# Patient Record
Sex: MALE | Race: WHITE | Employment: UNEMPLOYED | URBAN - METROPOLITAN AREA
[De-identification: names, ages, dates, MRNs, and addresses within clinical notes are randomized per-mention and may not be internally consistent; named-entity substitution may affect disease eponyms.]

---

## 2018-06-25 ENCOUNTER — TRANSFERRED RECORDS (OUTPATIENT)
Dept: HEALTH INFORMATION MANAGEMENT | Facility: CLINIC | Age: 33
End: 2018-06-25

## 2018-06-25 ENCOUNTER — HOSPITAL ENCOUNTER (INPATIENT)
Facility: HOSPITAL | Age: 33
LOS: 8 days | Discharge: HOME | End: 2018-07-03
Attending: PSYCHIATRY & NEUROLOGY | Admitting: PSYCHIATRY & NEUROLOGY
Payer: MEDICAID

## 2018-06-25 DIAGNOSIS — F39 EPISODIC MOOD DISORDER (H): ICD-10-CM

## 2018-06-25 DIAGNOSIS — F29 PSYCHOSIS, UNSPECIFIED PSYCHOSIS TYPE (H): ICD-10-CM

## 2018-06-25 DIAGNOSIS — F41.9 ANXIETY: Primary | ICD-10-CM

## 2018-06-25 LAB
ALT SERPL-CCNC: 20 IU/L (ref 6–40)
AST SERPL-CCNC: 17 IU/L (ref 10–40)
CREAT SERPL-MCNC: 1.35 MG/DL (ref 0.7–1.2)
GLUCOSE SERPL-MCNC: 111 MG/DL (ref 70–100)
POTASSIUM SERPL-SCNC: 3.5 MEQ/L (ref 3.4–5.1)

## 2018-06-25 PROCEDURE — 25000132 ZZH RX MED GY IP 250 OP 250 PS 637: Performed by: NURSE PRACTITIONER

## 2018-06-25 PROCEDURE — 12400000 ZZH R&B MH

## 2018-06-25 RX ORDER — ALUMINA, MAGNESIA, AND SIMETHICONE 2400; 2400; 240 MG/30ML; MG/30ML; MG/30ML
30 SUSPENSION ORAL EVERY 4 HOURS PRN
Status: DISCONTINUED | OUTPATIENT
Start: 2018-06-25 | End: 2018-07-03 | Stop reason: HOSPADM

## 2018-06-25 RX ORDER — HYDROXYZINE HYDROCHLORIDE 25 MG/1
25-50 TABLET, FILM COATED ORAL EVERY 4 HOURS PRN
Status: DISCONTINUED | OUTPATIENT
Start: 2018-06-25 | End: 2018-07-03 | Stop reason: HOSPADM

## 2018-06-25 RX ORDER — OLANZAPINE 10 MG/2ML
10 INJECTION, POWDER, FOR SOLUTION INTRAMUSCULAR 3 TIMES DAILY PRN
Status: DISCONTINUED | OUTPATIENT
Start: 2018-06-25 | End: 2018-06-29 | Stop reason: ALTCHOICE

## 2018-06-25 RX ORDER — OLANZAPINE 10 MG/1
10 TABLET ORAL 3 TIMES DAILY PRN
Status: DISCONTINUED | OUTPATIENT
Start: 2018-06-25 | End: 2018-06-29 | Stop reason: ALTCHOICE

## 2018-06-25 RX ORDER — ACETAMINOPHEN 325 MG/1
650 TABLET ORAL EVERY 4 HOURS PRN
Status: DISCONTINUED | OUTPATIENT
Start: 2018-06-25 | End: 2018-07-03 | Stop reason: HOSPADM

## 2018-06-25 RX ADMIN — HYDROXYZINE HYDROCHLORIDE 50 MG: 25 TABLET ORAL at 20:09

## 2018-06-25 RX ADMIN — NICOTINE POLACRILEX 4 MG: 2 GUM, CHEWING ORAL at 20:09

## 2018-06-25 RX ADMIN — OLANZAPINE 10 MG: 10 TABLET ORAL at 20:09

## 2018-06-25 ASSESSMENT — ACTIVITIES OF DAILY LIVING (ADL)
RETIRED_EATING: 0-->INDEPENDENT
LAUNDRY: UNABLE TO COMPLETE
DRESS: SCRUBS (BEHAVIORAL HEALTH);INDEPENDENT
RETIRED_COMMUNICATION: 0-->UNDERSTANDS/COMMUNICATES WITHOUT DIFFICULTY
AMBULATION: 0-->INDEPENDENT
DRESS: 0-->INDEPENDENT
ORAL_HYGIENE: INDEPENDENT
GROOMING: INDEPENDENT
TRANSFERRING: 0-->INDEPENDENT
BATHING: 0-->INDEPENDENT
COGNITION: 0 - NO COGNITION ISSUES REPORTED
FALL_HISTORY_WITHIN_LAST_SIX_MONTHS: NO
TOILETING: 0-->INDEPENDENT
SWALLOWING: 0-->SWALLOWS FOODS/LIQUIDS WITHOUT DIFFICULTY

## 2018-06-25 NOTE — IP AVS SNAPSHOT
MRN:[id number]                      After Visit Summary   6/25/2018    Hung Santiago    MRN: [id number]           Thank you!     Thank you for choosing [facility] for your care. Our goal is always to provide you with excellent care. Hearing back from our patients is one way we can continue to improve our services. Please take a few minutes to complete the written survey that you may receive in the mail after you visit with us. Thank you!        Patient Information     Date Of Birth          1985        Designated Caregiver       Most Recent Value    Caregiver    Will someone help with your care after discharge? no      About your hospital stay     You were admitted on:  June 25, 2018 You last received care in the:  [facility]    You were discharged on:  July 3, 2018       Who to Call     For medical emergencies, please call 911.  For non-urgent questions about your medical care, please call your primary care provider or clinic, None          Attending Provider     Provider Specialty    Archie Hoskins MD Psychiatry    Radha Lee NP Nurse Practitioner       Primary Care Provider Fax #    Physician No Ref-Primary [phone]      Further instructions from your care team       Behavioral Discharge Planning and Instructions    Summary: Patient was admitted with increased psychosis     Main Diagnosis: Psychosis, NOS, Insomnia, NOS, Cannabis abuse, with intoxication, r/o with induced perceptual disorder    Major Treatments, Procedures and Findings: Stabilize with medications, connect with community programs.    Symptoms to Report: feeling more aggressive, increased confusion, losing more sleep, mood getting worse or thoughts of suicide    Lifestyle Adjustment: Take all medications as prescribed, meet with doctor/ medication provider, out patient therapist, , and [facility] worker as scheduled. Abstain from alcohol or any unprescribed drugs.    Psychiatry Follow-up:       [facility]  - Sabine Mendenhalleusebio- As arranged   [address]  [place] [address]  Phone: [phone]    [facility]  PCP- Dr. Garcia- July 6th @ 11:15am   Will need Lithium Level drawn  [address]     [place] [address]    Phone\"[phone]   Fax:[phone]     [facility]   Medication Management- Dr. Jesus - July 25th @ 8:00am    [address]   [place] [address]  Phone: [phone]  Fax:     [facility]- Diagnostic assessment - July 19th @ 9:15am   [address] [facility]  [place] [id number] [phone]  Phone: [phone]  Fax: [phone]      Resources:   Crisis Intervention: 868.857.9892 or 095-740-0528 (TTY: 633.592.6684).  Call anytime for help.  National [place] on Mental Illness (www.[place].jennifer.org): 899.610.1189 or 747-403-9578.  Alcoholics Anonymous (www.alcoholics-anonymous.org): Check your phone book for your local chapter.  Suicide Awareness Voices of Education (SAVE) (www.save.org): 914-534-CHCE (6378)  National Suicide Prevention Line (www.mentalhealthmn.org): 697-303-WMSN (9883)  Mental Health Consumer/Survivor Network of [place] (www.mhcsn.net): 473.926.9132 or 535-499-5930  Mental Health Association of [place] (www.mentalhealth.org): 413.407.8750 or 228-962-1983    General Medication Instructions:   See your medication sheet(s) for instructions.   Take all medicines as directed.  Make no changes unless your doctor suggests them.   Go to all your doctor visits.  Be sure to have all your required lab tests. This way, your medicines can be refilled on time.  Do not use any drugs not prescribed by your doctor.  Avoid alcohol.      [place] Statewide:  [place] Crisis and Referral Services: 1-814.452.1060  National Suicide Prevention Lifeline: 0-232-309-TALK (8255)   - rjm6uplh- Text \"Life\" to 45035  First Call for Help: 2-1-1  JENNIFER Helpline- 4-532-PORL-HELP        Pending Results     No orders found from 6/23/2018 to 6/26/2018.          " "  Statement of Approval     Ordered          18 1429  I have reviewed and agree with all the recommendations and orders detailed in this document.  EFFECTIVE NOW     Approved and electronically signed by:  Rafael Wilkinson NP             Admission Information     Date & Time Provider Department Dept. Phone    2018 Radha Lee NP [facility] [phone]      Your Vitals Were     Blood Pressure Pulse Temperature Respirations Height Weight    134/83 90 99.1  F (37.3  C) (Tympanic) 16 1.753 m (5' 9\") 74.2 kg (163 lb 9.6 oz)    Pulse Oximetry BMI (Body Mass Index)                96% 24.16 kg/m2          MyChart Information     [employer] lets you send messages to your doctor, view your test results, renew your prescriptions, schedule appointments and more. To sign up, go to [email or web address] . Click on \"Log in\" on the left side of the screen, which will take you to the Welcome page. Then click on \"Sign up Now\" on the right side of the page.     You will be asked to enter the access code listed below, as well as some personal information. Please follow the directions to create your username and password.     Your access code is: [email or web address]  Expires: 2018 11:02 AM     Your access code will  in 90 days. If you need help or a new code, please call your [facility] clinic or [phone].        Care EveryWhere ID     This is your Care EveryWhere ID. This could be used by other organizations to access your [facility] medical records  [id number]        Equal Access to Services     [facility] Center[facility] : Hadii rodney hammond Sojael, waaxda luqadaha, qaybta kaalmada michelle yee . So [facility] [phone].    ATENCIÓN: Si habla español, tiene a cuba disposición servicios gratuitos de asistencia lingüística. Llame al [phone].    We comply with applicable federal civil rights laws and [place] laws. We do not discriminate on the basis of race, color, " national origin, age, disability, sex, sexual orientation, or gender identity.               Review of your medicines      START taking        Dose / Directions    hydrOXYzine 25 MG tablet   Commonly known as:  ATARAX   Used for:  Anxiety        Dose:  25-50 mg   Take 1-2 tablets (25-50 mg) by mouth 2 times daily as needed for anxiety   Quantity:  60 tablet   Refills:  0       lithium 300 MG CR tablet   Commonly known as:  ESKALITH/LITHOBID   Used for:  Episodic mood disorder (H)        Dose:  300 mg   Take 1 tablet (300 mg) by mouth 2 times daily   Quantity:  60 tablet   Refills:  0         CONTINUE these medicines which may have CHANGED, or have new prescriptions. If we are uncertain of the size of tablets/capsules you have at home, strength may be listed as something that might have changed.        Dose / Directions    * QUEtiapine 25 MG tablet   Commonly known as:  SEROquel   This may have changed:    - how much to take  - when to take this  - reasons to take this        Dose:  25 mg   Take 1 tablet (25 mg) by mouth 3 times daily as needed (anxiety, agitation, racing thoughts or hallucinations)   Quantity:  60 tablet   Refills:  0       * QUEtiapine 400 MG tablet   Commonly known as:  SEROquel   This may have changed:  You were already taking a medication with the same name, and this prescription was added. Make sure you understand how and when to take each.        Dose:  800 mg   Take 2 tablets (800 mg) by mouth At Bedtime   Quantity:  60 tablet   Refills:  0       * Notice:  This list has 2 medication(s) that are the same as other medications prescribed for you. Read the directions carefully, and ask your doctor or other care provider to review them with you.         Where to get your medicines      These medications were sent to [id number] - [place], [place] - [address]  [address] [place] [address]    Hours:  Tested OK 1/21/03  CASH Phone:  [phone]     hydrOXYzine 25 MG tablet    lithium  300 MG CR tablet    QUEtiapine 25 MG tablet    QUEtiapine 400 MG tablet                Protect others around you: Learn how to safely use, store and throw away your medicines at www.disposemymeds.org.             Medication List: This is a list of all your medications and when to take them. Check marks below indicate your daily home schedule. Keep this list as a reference.      Medications           Morning Afternoon Evening Bedtime As Needed    hydrOXYzine 25 MG tablet   Commonly known as:  ATARAX   Take 1-2 tablets (25-50 mg) by mouth 2 times daily as needed for anxiety   Last time this was given:  50 mg on 7/2/2018  1:42 PM                                lithium 300 MG CR tablet   Commonly known as:  ESKALITH/LITHOBID   Take 1 tablet (300 mg) by mouth 2 times daily   Last time this was given:  300 mg on 7/2/2018  8:53 PM                                * QUEtiapine 25 MG tablet   Commonly known as:  SEROquel   Take 1 tablet (25 mg) by mouth 3 times daily as needed (anxiety, agitation, racing thoughts or hallucinations)   Last time this was given:  800 mg on 7/2/2018  8:54 PM                                * QUEtiapine 400 MG tablet   Commonly known as:  SEROquel   Take 2 tablets (800 mg) by mouth At Bedtime   Last time this was given:  800 mg on 7/2/2018  8:54 PM                                * Notice:  This list has 2 medication(s) that are the same as other medications prescribed for you. Read the directions carefully, and ask your doctor or other care provider to review them with you.

## 2018-06-25 NOTE — IP AVS SNAPSHOT
[facility]    [address] [place] [id number]    Phone:  [phone]    Fax:  [phone]                                       After Visit Summary   6/25/2018    Hung Santiago    MRN: [id number]           After Visit Summary Signature Page     I have received my discharge instructions, and my questions have been answered. I have discussed any challenges I see with this plan with the nurse or doctor.    ..........................................................................................................................................  Patient/Patient Representative Signature      ..........................................................................................................................................  Patient Representative Print Name and Relationship to Patient    ..................................................               ................................................  Date                                            Time    ..........................................................................................................................................  Reviewed by Signature/Title    ...................................................              ..............................................  Date                                                            Time

## 2018-06-25 NOTE — PLAN OF CARE
"ADMISSION NOTE    Reason for admission odd behavior, psychosis, hallucinations.  Safety concerns none.  Risk for or history of violence none.   Full skin assessment: done, superficial cuts to right forearm and left hand    Patient arrived on unit from [facility] accompanied by security and ambulance staff on 6/25/2018  2:42 PM.   Status on arrival: calm, cooperative, responding to internal stimuli  /78  Pulse 101  Temp 98.4  F (36.9  C) (Tympanic)  Resp 16  Ht 1.753 m (5' 9\")  Wt 66.3 kg (146 lb 1.6 oz)  SpO2 98%  BMI 21.58 kg/m2  Patient given tour of unit and Welcome to  unit papers given to patient, wanding completed, belongings inventoried, and admission assessment completed.   Patient's legal status on arrival is 72 Hour Hold. Appropriate legal rights discussed with and copy given to patient. Patient Bill of Rights discussed with and copy given to patient.   Patient denies SI, HI, and thoughts of self harm and contracts for safety while on unit.      Rachel Alejo  6/25/2018  3:00 PM            "

## 2018-06-25 NOTE — PLAN OF CARE
Face to face end of shift report received from Rachel PETERSON RN. Rounding completed. Patient observed in group room.     Alisa Kerr  6/25/2018  3:40 PM

## 2018-06-25 NOTE — PROGRESS NOTES
06/25/18 5019   Patient Belongings   Did you bring any home meds/supplements to the hospital?  No   Patient Belongings clothing;jewelry;shoes   Belongings Search Yes   Clothing Search Yes   Second Staff Lety   General Info Comment Blue Shirt, Grey Cargo Pants, Blue Boxers, Tan Footies, , White Etnies    List items sent to safe: Silver Colored Chain  All other belongings put in assigned cubby in belongings room.     I have reviewed my belongings list on admission and verify that it is correct.     Patient signature_______________________________    Second staff witness (if patient unable to sign) ______________________________       I have received all my belongings at discharge.    Patient signature________________________________    Lupe  6/25/2018  6:06 PM

## 2018-06-26 LAB — TSH SERPL DL<=0.005 MIU/L-ACNC: 0.98 MU/L (ref 0.4–4)

## 2018-06-26 PROCEDURE — 25000132 ZZH RX MED GY IP 250 OP 250 PS 637: Performed by: NURSE PRACTITIONER

## 2018-06-26 PROCEDURE — 84443 ASSAY THYROID STIM HORMONE: CPT | Performed by: NURSE PRACTITIONER

## 2018-06-26 PROCEDURE — 99223 1ST HOSP IP/OBS HIGH 75: CPT | Performed by: NURSE PRACTITIONER

## 2018-06-26 PROCEDURE — 12400000 ZZH R&B MH

## 2018-06-26 PROCEDURE — 36415 COLL VENOUS BLD VENIPUNCTURE: CPT | Performed by: NURSE PRACTITIONER

## 2018-06-26 RX ORDER — QUETIAPINE FUMARATE 300 MG/1
600 TABLET, FILM COATED ORAL AT BEDTIME
Status: DISCONTINUED | OUTPATIENT
Start: 2018-06-26 | End: 2018-06-30 | Stop reason: ALTCHOICE

## 2018-06-26 RX ADMIN — NICOTINE POLACRILEX 4 MG: 2 GUM, CHEWING ORAL at 21:09

## 2018-06-26 RX ADMIN — OLANZAPINE 10 MG: 10 TABLET ORAL at 18:05

## 2018-06-26 RX ADMIN — HYDROXYZINE HYDROCHLORIDE 50 MG: 25 TABLET ORAL at 09:41

## 2018-06-26 RX ADMIN — QUETIAPINE 600 MG: 300 TABLET, FILM COATED ORAL at 21:09

## 2018-06-26 ASSESSMENT — ACTIVITIES OF DAILY LIVING (ADL)
GROOMING: INDEPENDENT
GROOMING: INDEPENDENT
ORAL_HYGIENE: INDEPENDENT
DRESS: SCRUBS (BEHAVIORAL HEALTH)
LAUNDRY: UNABLE TO COMPLETE
DRESS: SCRUBS (BEHAVIORAL HEALTH)
ORAL_HYGIENE: INDEPENDENT

## 2018-06-26 NOTE — PLAN OF CARE
"Problem: Patient Care Overview  Goal: Individualization & Mutuality  Patient will remain independent with ADLs daily.   Patient will sleep 6-8 hours a night.  Patient will participate in 50% or more of unit programming.    Outcome: No Change  Patient has been calm, cooperative, and medication compliant this shift. He spent much of the day in his room but did come out for lunch and one group. TSH levels done and are WNL.  No complaints of pain.  VS WNL.     Problem: Thought Process Alteration (Adult)  Goal: Improved Thought Process  Patient will verbalize a reduction/resolution in hallucinations and delusional thinking by 6/28/18.     Outcome: No Change  Patient continues to report racing thoughts.  He took Atarax 50 mg at 0941 to help him \"slow down.\"  Client states that it works well for him.   He did apologize to this writer today stating, \"I'm sorry I just had a bad inappropriate thought about you.\"  Discussed with patient appropriate boundaries and behaviors while on the unit.  Patient verbalizes understanding and states he will remain in control of his behavior.       "

## 2018-06-26 NOTE — PLAN OF CARE
Problem: Patient Care Overview  Goal: Team Discussion  Team Plan:   BEHAVIORAL TEAM DISCUSSION    Participants: Jerilyn Edward NP, Radha Lee NP, Rafael Wilkinson NP, Henna Queen LICSW, Shelby Rich LSW,  Hailee Herbert LSW, Chapis MOREJON student, Tiff Schulz RN, Rachel Alejo RN, Andra Wade RN, Archana Santana OT, Jerilyn Kendall OT  Progress: new admission, hallucinations  Continued Stay Criteria/Rationale: new admit, Provider and  to assess, sexually preoccupied  Medical/Physical: superficial cuts to forearm and left hand  Precautions:   Behavioral Orders   Procedures     Code 1 - Restrict to Unit     Routine Programming     As clinically indicated     Self Injury Precaution     Bathroom door to remain locked until after provider evaluation     Sexual precautions     Status 15     Every 15 minutes.     Plan: new admission to assess  Rationale for change in precautions or plan: none

## 2018-06-26 NOTE — PLAN OF CARE
"Problem: Patient Care Overview  Goal: Individualization & Mutuality  Patient will remain independent with ADLs daily.   Patient will sleep 6-8 hours a night.  Patient will participate in 50% or more of unit programming.    Outcome: No Change  Pt denies SI, HI, hallucinations. He appears preoccupied at times and does state that he is having trouble concentrating on admission assessment. He reports that he has \"too many thoughts and they won't stop.\" Pt is cooperative with assessment. His affect is blunted and flat. He states that he was admitted because \"my dad called the  on me.\" He was apparently religiously preoccupied in ER though has not made any Restorationist statements to this writer. Pt has been in bed for most of shift, does get up for dinner and snack. States that he has had difficulty sleeping for some time. 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 Denies ever having any SI though does state that he has access to guns. Reports \"They are 22 caliber rifles though. It'd be pretty hard to kill yourself with one of those.\" Pt is very forthcoming despite his difficulty concentrating. He states that he just wants all his thoughts to slow down. He does make several hypersexual comments to this writer including \"I just can't help but looking at your chest. I know it's wrong and it's embarrassing. I can't even look you in the eye because I keep looking down at your chest.\" Pt was redirectable after this but a few minutes later states \"I can't help but think that I just want to fuck you right now. I'm sorry. I know that's wrong. I don't know what's wrong with me.\" Admission assessment was terminated at this time and pt was placed on sexual precautions. Pt is requesting PRN for racing thoughts and to help him sleep. He accepted PRN zyprexa 10mg and atarax 50mg at 2009.    1630- Pt's father " "called. He is concerned that pt has \"iodine poisoning.\" States that pt orders multiple bottles of iodine supplement online. Father reports that the suggested dose on the bottle is one drop (650 mcg) daily however pt has been taking approx half a bottle daily. Father reports that he thinks this is causing his son to behave erratically. ER lab work was reviewed, no TSH was drawn in ER. Sticky note was left for provider.     Problem: Thought Process Alteration (Adult)  Goal: Improved Thought Process  Patient will verbalize a reduction/resolution in hallucinations and delusional thinking by 6/28/18.   Outcome: No Change  Pt appears preoccupied at times. He denies hallucinations. Has not made any delusional statements this shift.       "

## 2018-06-26 NOTE — PLAN OF CARE
Face to face end of shift report received from Laura LYNN RN. Rounding completed. Patient observed.     Trudy Licea  6/26/2018  4:21 PM

## 2018-06-26 NOTE — PLAN OF CARE
"Spoke with patients father on the phone today.  He reports that patient is usually very smart and likes to \"make you think.\"  Father says that lately patient has made statements about how it is unfair that his sister is disabled and his father is retired and he still has to work when they get free money.  Father has questioned wether some of this is an \"act\" but he doesn't understand mental illness well enough.  Father will call for updates on patient daily.   "

## 2018-06-26 NOTE — H&P
"Psychiatric Eval/H&P  Patient Name: Hung Santiago   YOB: 1985  Age: 32 year old  [id number]    Primary Physician: No Ref-Primary, Physician   Completed By: Rafael Wilkinson NP     CC:  Psychosis    HPI  Hung Santiago is a 32 year old male who presented via via [facility] ED with reports of acute psychosis. [facility] police after father called them reporting Yazdanism preoccupation. In the ED he was claiming to be God and that there were demons inside of him needing to be struck down. He did leave his house the evening prior to take a run and was found by police running around the neighborhood naked. Patient's father reported that Hung has been taking an entire bottle of Iodine daily and was concerned this was contributing to symptoms. No history of hashimoto's, so it is unlikely that he would be impacted by increased iodine intake. No TSH in ED, so this was ordered upon admission. Previous hospitalization in April 2018 for similar presentation. Admitted and started on /zyprexa, which was then changed to Seroquel 600mg. He reported in the ED that he ran out of the medication 2 weeks prior. Reported lack of sleep for \"a month.\"   Utox positive for THC and benzodiazepines. Labs indicate some mild dehydration.    Hung is much clearer today, having slept last night, and denies ongoing auditory hallucinations. He does recognize that he was experiencing odd thoughts and behavior and questions why this is happening. He believes that he started having issues about a year ago when he stopped drinking, which resulted in persistent insomnia. In April, this insomnia led him to relapse and smoke marijuana, with the hope of sleeping, but it made the sleep issue worse and possibly triggered the first psychotic episode. Most recently, having run out of Seroquel, he struggled with sleep again and tried some of his sister's Valium and marijuana, which he believes also made \"everything\" worse, including the " "psychosis. He is worried about how to move forward in life and indicates that his sleep issues have been so bad that he had to quit his job. He denies depression or anxiety outside of frustration with inability to sleep. He reports being exhausted but unable to sleep when he tries. Denies hyperactivity, pressured speech, excessive productivity, or out of character behaviors during these disruptive sleep periods; however, once he becomes psychotic, he does appear to become hyper-fixated on interpersonal relations and is sexually inappropriate. He also recognizes this. At one point he indicates that he has a \"silly question,\" and asks, \"do any of you want to go on a date?\"     Hung denies any family history of mental illness; however, later while discussing his mother, describing her \"irresponsibility with finances,\" moving to [place], and abruptly threatening to move his sister who has cerebral palsy into a \"home,\" unless she found a place to live, is suspicious.     [facility]     Past Psychiatric History:   IP for  in April 2018 with diagnosis of acute psychosis questionably related to alcohol and marijuana use at the time. Did well on initial dose of Zyprexa and then titration of Seroquel up to 300mg and then to 600mg. He did miss his follow up appointments and subsequently ran out of seroquel. This appears to have bee refilled by a Dr. Johnson out of [facility] in [place] on the first of June, and  was confirmed by the transferring ED. He believes he may have double or tripled the dose secondary to confusion.      Social History:   Raised by parents who  when he was nearly an adult. Quit HS in 11th grade after he was expelled twice for having a switch blade on campus and then for smoking on school property. No GED. No college. 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 Is unsure when he gets off of probation. Never . No children. No " " history.     Chemical Use History:   Reported frequent use of alcohol with cessation following his last hospitalization in April. He reports that he stopped drinking a year ago and only briefly resumed consumption prior to his first \"psychotic break,\" in April. Denied drug use in ED, but did admit to smoking marijuana and taking some valium in order to obtain sleep. No history of CD treatment.      Family Psychiatric History:   Denies but mother's lifestyle patterns suggest there may be some presence of mood disorder.        Medical History and ROS  No current outpatient prescriptions on file.     Allergies   Allergen Reactions     Neosporin [Neomycin-Polymyxin-Gramicidin]      No past medical history on file.  No past surgical history on file.      Physical Exam    Constitutional:appears well-developed and well-nourished.   HENT:   Head: Normocephalic and atraumatic.   Right Ear: External ear normal.   Left Ear: External ear normal.   Nose: Nose normal.   Mouth/Throat: External oral area intact.   Eyes: Conjunctivae and EOM are normal.   Neck: Normal range of motion.   Cardiovascular: Normal rate.  Pulmonary/Chest: Effort normal. No respiratory distress.   Skin: Dry, intact.    Review of Systems:  Constitution: No weight loss, fever, night sweats  Skin: No rashes, pruritus or open wounds  Neuro: No headaches or seizure activity.  Psych:  See HPI  Eyes: No vision changes.  ENT: No problems chewing or swallowing.   Musculoskeletal: No muscle pain, joint pain or swelling   Respiratory: No cough or dyspnea  Cardiovascular:  No chest pain,  palpitations or fainting  Gastrointestinal:  No abdominal pain, nausea, vomiting or change in bowel habits    MSE/PSYCH  PSYCHIATRIC EXAM  /67  Pulse 72  Temp 97.6  F (36.4  C) (Tympanic)  Resp 14  Ht 1.753 m (5' 9\")  Wt 66.3 kg (146 lb 1.6 oz)  SpO2 98%  BMI 21.58 kg/m2  -Appearance/Behavior: Awake, alert, disheveled.  -Attitude:pleasant and " "cooperative  -Motor: normal or unremarkable.  -Gait: Normal.    -Abnormal involuntary movements: None noted.  -Mood: worried.  -Affect: Appropriate/mood-congruent.  -Speech: Normal volume and rate - rambles some and makes some odd statements, but primarily normal content.                 -Thought process/associations: Logical, Goal directed and Rambling.  -Thought content: normal .  -Perceptual disturbances: No hallucinations..              -Suicidal/Homicidal Ideation: Denies  -Judgment: Fair.  -Insight: Fair.  *Orientation: time, place and person.  *Memory: Intact.  *Attention: Good  *Language: fluent, no aphasias, able to repeat phrases and name objects. Vocab intact.  *Fund of information: appropriate for education - low average.  *Cognitive functioning estimate: low average.     Labs:   Sodium 144, high Creatinine 1.35, Glucose 111, High WBC 16, High RBC 5.96, high HGB 17.7, high HCT 53.3, high Neutrophils Absolute 12.5     Assessment/Impression: This is a 32 year old yo male with recent onset of psychosis, possibly triggered by substance use versus secondary to sleep deprivation. History of one inpatient hospitalization in April 2018 with acute psychosis believed to be related to alcohol and marijuana use; however, both episodes, including present symptoms, also followed a prolonged period without sleep. No previous mental health history. Was initially treated with Zyprexa for sleep and then changed to Seroquel, titrating up to 600mg at bed. Responded well until he ran out of medications. Seroquel refilled on June 1; however, he ran out 2 weeks ago, self reporting that it \"stopped working\" and he had to take more. He did take 600mg yesterday and became sleepy, being able to sleep last night. Agreed to continue with the Seroquel at 600mg until he has slept a night or two, review the need for an increase, change to XR formula, or switch to a different medication. Suspect he would benefit greatly from a mood " stabilizer like Lithium, or Lamictal long-term. He is also agreeable to increased outpatient supports.     Educated regarding medication indications, risks, benefits, side effects, contraindications and possible interactions. Verbally expressed understanding.     DX:  Psychosis, NOS  Insomnia, NOS  Cannabis abuse, with intoxication, r/o with induced perceptual disorder     Plan:  Admit to Unit: 5 [facility]  Attending: KULDIP Livingston CNP  Patient is: Voluntary  Other routine labs were notable for + THC and benzodiazepines  Monitor for improved sleep, cessation of psychotic symptoms and response to medication  Provide a safe environment and therapeutic milieu.   Resume Seroquel 600mg at bedtime.    Anticipated length of stay: 3-5 days       KULDIP Livingston, CNP

## 2018-06-26 NOTE — PLAN OF CARE
Problem: Patient Care Overview  Goal: Individualization & Mutuality  Patient will remain independent with ADLs daily.   Patient will sleep 6-8 hours a night.  Patient will participate in 50% or more of unit programming.    Outcome: No Change  Slept all noc without issue.

## 2018-06-26 NOTE — PLAN OF CARE
Face to face end of shift report received from Martha MARTINEZ RN. Rounding completed. Patient observed in [facility].     Laura Pedroza  6/26/2018  8:06 AM

## 2018-06-26 NOTE — PLAN OF CARE
"Social Service Psychosocial Assessment  Presenting Problem:   Patient was admitted with psychosis and was religiously preoccupied.  Pt stated he missed his follow up apts, ran out of medications, and hasn't been sleeping well. Telephone note states pt was saying that he is God and that the demons are inside of him. Police were called and pt was picked up because pt was running through the neighborhood naked. Pt's father reported that pt's behavior has been increasingly odd over the past few months and pt has been hearing voices.   At end of assessment pt asks if he can ask a \"silly question\" and says \"Anyone want to go on a date with me.\"   Marital Status:   Single   Spouse / Children:    No children   Psychiatric TX HX:   First hospitalization was in April of this year  Suicide Risk Assessment:  Patient was admitted with psychosis. Denies SI on admit. Denies past suicide attempts. Denies SI today. States he would never attempt suicide \"I just want to want heal everyone, I love to help.\"   Access to Lethal Means (explain):   States he has an old .22 rifle in the closet that he hasn't touched in years.  Family Psych HX:   None reported   A & Ox:   x3  Medication Adherence:   Unknown   Medical Issues:   See H&P  Visual  Motor Functioning:   Ok   Communication Skills /Needs:   Ok  Ethnicity:   White     Spirituality/Presybeterian Affiliation: Congregational-  Religiously preoccupied   Clergy Request:   No   History:   None reported   Living Situation:   Lives in [place] with his dad and sister who has cerebral palsy. Says he shares a room with his dad. 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 Raised by both parents until they  with pt was a teen. He then lived with his mom- says she wasn't responsible with money and they were forced to move around multiple times. He talks about having to work to pay for the deposits at the new homes. Has a younger sister with cerebral palsy- says his mom told her she needed to move out and then they moved in with their dad. Says his mom moved to [facility] and he doesn't have much communication with her. Says him and his dad \"bunk\" together so his sister can have her own room. Says he was forced to become an adult at an early age so he never really had a childhood.   Trauma Abuse HX:   None reported   Relationship / Sexuality:   Denies current relationship- States he has been single for 3 years. Was in a 10 year relationship with his fiance.  Substance Use/ Abuse:   Utox was positive for Benzos and THC. Pt states he was a heavy drinker and when he stopped using he was unable to sleep and thinks that caused the psychosis. Admits to marijuana use. States he stopped drinking after his hospitalization in April.   Chemical Dependency Treatment HX:   None reported- States he stopped using on his own  Legal Issues:   States he is on probation in [facility] with Sabine Baca for 2 DWI's for Dec of 2016- Says after 2 years he is able to get the breathalyzer out of his car   Significant Life Events:   None reported   Strengths:   Accepting of services, Supportive family  Challenges /Limitation:   Psychosis, substance abuse, Lack of follow through   Patient Support Contact (Include name, relationship, number, and summary of conversation):  Pt has a release signed for his father Otto Santiago [phone]. Will attempt to contact pt supports.   Interventions:   " "    Community-Based Programs- [facility]- would benefit     Medical/Dental Care- PCP- Dr. Garcia- July 6th     CD Evaluation/Rule 25/Aftercare- Would benefit     Medication Management- [facility]- July 26th     Individual Therapy- Would benefit- If interested     Case Management- Would benefit- - Sabine Baca- [facility]     Insurance Coverage- [place]    Suicide Risk Assessment- Patient was admitted with psychosis. Denies SI on admit. Denies past suicide attempts. Denies SI today. States he would never attempt suicide \"I just want to want heal everyone, I love to help.\"     High Risk Safety Plan- Talk to supports; Call crisis lines; Go to local ER if feeling suicidal.      "

## 2018-06-26 NOTE — PLAN OF CARE
Face to face end of shift report received from EMMA Cuellar. Rounding completed. Patient observed. Lying on right side - eyes closed - non-labored breathing noted.     Martha Bojorquez  6/26/2018  1:31 AM

## 2018-06-27 PROCEDURE — 12400000 ZZH R&B MH

## 2018-06-27 PROCEDURE — 25000132 ZZH RX MED GY IP 250 OP 250 PS 637: Performed by: NURSE PRACTITIONER

## 2018-06-27 PROCEDURE — 99233 SBSQ HOSP IP/OBS HIGH 50: CPT | Performed by: NURSE PRACTITIONER

## 2018-06-27 RX ADMIN — NICOTINE POLACRILEX 4 MG: 2 GUM, CHEWING ORAL at 19:26

## 2018-06-27 RX ADMIN — HYDROXYZINE HYDROCHLORIDE 50 MG: 25 TABLET ORAL at 17:02

## 2018-06-27 RX ADMIN — NICOTINE POLACRILEX 4 MG: 2 GUM, CHEWING ORAL at 08:10

## 2018-06-27 RX ADMIN — HYDROXYZINE HYDROCHLORIDE 50 MG: 25 TABLET ORAL at 12:59

## 2018-06-27 RX ADMIN — QUETIAPINE 600 MG: 300 TABLET, FILM COATED ORAL at 21:04

## 2018-06-27 RX ADMIN — OLANZAPINE 10 MG: 10 TABLET ORAL at 19:29

## 2018-06-27 RX ADMIN — NICOTINE POLACRILEX 4 MG: 2 GUM, CHEWING ORAL at 17:02

## 2018-06-27 RX ADMIN — NICOTINE POLACRILEX 4 MG: 2 GUM, CHEWING ORAL at 12:59

## 2018-06-27 RX ADMIN — HYDROXYZINE HYDROCHLORIDE 50 MG: 25 TABLET ORAL at 08:10

## 2018-06-27 RX ADMIN — NICOTINE POLACRILEX 4 MG: 2 GUM, CHEWING ORAL at 21:06

## 2018-06-27 ASSESSMENT — ACTIVITIES OF DAILY LIVING (ADL)
ORAL_HYGIENE: INDEPENDENT
DRESS: SCRUBS (BEHAVIORAL HEALTH)
GROOMING: INDEPENDENT
LAUNDRY: UNABLE TO COMPLETE
ORAL_HYGIENE: INDEPENDENT
DRESS: SCRUBS (BEHAVIORAL HEALTH)
GROOMING: INDEPENDENT

## 2018-06-27 NOTE — PLAN OF CARE
"Problem: Patient Care Overview  Goal: Discharge Needs Assessment  Outcome: Improving  Spoke with pt this afternoon- He states he continues to have wondering thoughts. Says he has been trying to keep busy by attending group programming and working on puzzles. States he isn't feeling \"back to normal\" and hasn't since the New Year- but he \"desperately\" wants to feel \"normal\" again. Talked with pt about follow up apts that have been arranged and referrals that have been sent. 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 Says until he builds up his clientele he plans on staying busy by watching videos from doctors, , physicists on the computer. When staff asked pt if he had any other questions or concerns he says \"I was going to ask if you were available, but I see that your not.\" Reminded pt of appropriate boundaries.       "

## 2018-06-27 NOTE — PROGRESS NOTES
"[facility]  Psychiatric Progress Note      Impression:     Hung Santiago is a 32 year old male who presented via via [facility] ED with reports of acute psychosis. [facility] police after father called them reporting Orthodoxy preoccupation. In the ED he was claiming to be God and that there were demons inside of him needing to be struck down. He did leave his house the evening prior to take a run and was found by police running around the neighborhood naked.    Hung reports that he slept well last night, is still feeling tired, and that he believes he just needs to \"catch up\" a little. He has been cooperative and primarily appropriate; however, he remains sexually preoccupied and had repeatedly expressed his thoughts to staff. He tells me that his \"mind is in the gutter,\" and that he just wants this to stop. Denies feeling any compulsions or need to act on his thoughts. He is unsure if this is something that occurs only during periods surrounding psychosis and lack of sleep, but reports that it \"definitely started the beginning of the year when I quit drinking.\" He then indicates that he has \"deprived\" himself for 5 years and that is why he believes he cannot stop thinking about it. He then tells me he needs to apologize for his impure thoughts.     Nurse did speak with his father today, who indicated that it \"sounds like he is back to normal.\" He would like to have Hung taking his medications but with him monitoring as he feels that Hung will just keep taking them until he overdoses. The nurse had a hard time getting his father to describe Hung's baseline, only indicating that he is \"more normal now.\"     Denies depression, SI/HI and psychosis. Reports some anxiety because of thoughts but believes that Atarax helped some. It is unclear if thoughts are directly related to psychosis, sleep disruption, possible edwin, or if they are part of an obsessive disorder. Will continue to monitor. May " "benefit from a mood stabilizer versus just a neuroleptic.          DIagnoses:     Psychosis, NOS  Insomnia, NOS  Cannabis abuse, with intoxication, r/o with induced perceptual disorder    Attestation:  Patient has been seen and evaluated by me,  Rafael Wilkinson NP          Interim History:   The patient's care was discussed with the treatment team and chart notes were reviewed.          Medications:   I have reviewed this patient's current medications    Prescription Medications as of 6/27/2018             QUEtiapine Fumarate (SEROQUEL PO) Take 300 mg by mouth At Bedtime      Facility Administered Medications as of 6/27/2018             acetaminophen (TYLENOL) tablet 650 mg Take 2 tablets (650 mg) by mouth every 4 hours as needed for mild pain    alum & mag hydroxide-simethicone (MYLANTA ES/MAALOX  ES) suspension 30 mL Take 30 mLs by mouth every 4 hours as needed for indigestion    hydrOXYzine (ATARAX) tablet 25-50 mg Take 1-2 tablets (25-50 mg) by mouth every 4 hours as needed for anxiety    magnesium hydroxide (MILK OF MAGNESIA) suspension 30 mL Take 30 mLs by mouth nightly as needed for constipation    nicotine polacrilex (NICORETTE) gum 2-4 mg Place 1-2 each (2-4 mg) inside cheek every hour as needed for other (nicotine withdrawal symptoms)    OLANZapine (zyPREXA) injection 10 mg Inject 10 mg into the muscle 3 times daily as needed for agitation (associated with psychosis or edwin)    Linked Group 1:  \"Or\" Linked Group Details     OLANZapine (zyPREXA) tablet 10 mg Take 1 tablet (10 mg) by mouth 3 times daily as needed for agitation (associated with psychosis or edwin)    Linked Group 1:  \"Or\" Linked Group Details     QUEtiapine (SEROquel) tablet 600 mg Take 2 tablets (600 mg) by mouth At Bedtime          10 point ROS reviewed with no new report       Allergies:     Allergies   Allergen Reactions     Neosporin [Neomycin-Polymyxin-Gramicidin]             Psychiatric Examination:   /67  Pulse 59  Temp " "96.2  F (35.7  C) (Tympanic)  Resp 14  Ht 1.753 m (5' 9\")  Wt 66.3 kg (146 lb 1.6 oz)  SpO2 97%  BMI 21.58 kg/m2  Weight is 146 lbs 1.6 oz  Body mass index is 21.58 kg/(m^2).    Appearance:  awake, alert and dressed in hospital scrubs  Attitude:  cooperative  Eye Contact:  good  Mood:  better  Affect:  mood congruent  Speech:  clear, coherent  Psychomotor Behavior:  no evidence of tardive dyskinesia, dystonia, or tics  Thought Process:  linear and goal oriented  Associations:  no loose associations  Thought Content:  no evidence of suicidal ideation or homicidal ideation and no evidence of psychotic thought; sexually preoccupied  Insight:  fair  Judgment:  fair  Oriented to:  time, person, and place  Attention Span and Concentration:  intact  Recent and Remote Memory:  intact  Fund of Knowledge: low-normal  Muscle Strength and Tone: normal  Gait and Station: Normal           Labs:     Results for orders placed or performed during the hospital encounter of 06/25/18   TSH with free T4 reflex   Result Value Ref Range    TSH 0.98 0.40 - 4.00 mU/L          Plan:     Continue Seroquel 600mg at bed.  Consider addition of mood stabilizer - possibly Lithium or Lamictal.   ELOS - 2-3 days    "

## 2018-06-27 NOTE — PLAN OF CARE
Problem: Patient Care Overview  Goal: Team Discussion  Team Plan:   BEHAVIORAL TEAM DISCUSSION    Participants: Shreya Hay NP,Rafael Wilkinson NP, Kirstie Wilkinson NP,  Shelby Rich LSW,  Hailee Herbert LSW,  María [facility], Yadi Drake OT, Jerilyn Kendall OT, Danielle Howard RN  Progress: Fair- Clearing, sexually preoccupied   Continued Stay Criteria/Rationale: Started Seroquel 600mg   Medical/Physical: superficial cuts to forearm and left hand  Precautions:   Behavioral Orders   Procedures     Code 1 - Restrict to Unit     Routine Programming     As clinically indicated     Self Injury Precaution     Bathroom door to remain locked until after provider evaluation     Sexual precautions     Status 15     Every 15 minutes.     Plan: Stabilize and arrange additional services   Rationale for change in precautions or plan: None

## 2018-06-27 NOTE — PLAN OF CARE
"Problem: Patient Care Overview  Goal: Individualization & Mutuality  Patient will remain independent with ADLs daily.   Patient will sleep 6-8 hours a night.  Patient will participate in 50% or more of unit programming.    Outcome: No Change  Patient pleasant and cooperative with assessment questioning. Admits to some anxiety related to \"sexual urges\", states \"I'm a man and I have needs that I haven't had met for a long time, I have these bad thoughts and I won't act on them, I just don't want to have them\". Did accept Atarax 50 mg PO at 0810. Is isolative and withdrawn to his room, did come out for breakfast, then went back to bed. Has been appropriate with staff and peers.   1235-writer spoke to patient's father on the phone, dad states \"he sounds more normal to me. He just needs to take his meds with us giving them out, otherwise he just takes them, he'll overdose on them, he'll overdose on anything\".   1400-patient sitting in lounge working on a puzzle.     Problem: Thought Process Alteration (Adult)  Goal: Improved Thought Process  Patient will verbalize a reduction/resolution in hallucinations and delusional thinking by 6/28/18.     Outcome: No Change  Patient more clear today with thoughts, does have some insight into his \"bad thoughts\", does not appear delusional and does not appear to be responding to internal stimuli. Is able to have a reality based conversation.       "

## 2018-06-27 NOTE — PLAN OF CARE
"Problem: Patient Care Overview  Goal: Individualization & Mutuality  Patient will remain independent with ADLs daily.   Patient will sleep 6-8 hours a night.  Patient will participate in 50% or more of unit programming.    Outcome: No Change  Patient requested and was given Zyprexa 10 mg po for \"impure thoughts\" patient states that he doesn't like to make eye contact because he doesn't want to have impure thoughts. Patient tells me that he had been resting on his bed at home and couldn't sleep so he got up and started running around the block, he states that he became very hot so he took his shirt and pants of to cool off and that when the police picked him up. Patient states he does not hear voices and denies depression or thoughts of self harm. Patient currently denies having any physical problems. Patient ate well for supper tonight but he did admit that he usually doesn't eat very well either because he just isn't hungry. After taking the Zyprexa patient rested on his bed and at 2030 got up for his snack.    Problem: Thought Process Alteration (Adult)  Goal: Improved Thought Process  Patient will verbalize a reduction/resolution in hallucinations and delusional thinking by 6/28/18.     Outcome: No Change  Patient continues to endorse \"impure thoughts\" but denies hallucinations.      "

## 2018-06-27 NOTE — PLAN OF CARE
Face to face end of shift report received from Rachel WALLS RN. Rounding completed. Patient observed.     Trudy Licea  6/27/2018  4:42 PM

## 2018-06-27 NOTE — PLAN OF CARE
Problem: Patient Care Overview  Goal: Individualization & Mutuality  Patient will remain independent with ADLs daily.   Patient will sleep 6-8 hours a night.  Patient will participate in 50% or more of unit programming.    Outcome: Improving  Slept all noc without issue.

## 2018-06-27 NOTE — PLAN OF CARE
Face to face end of shift report received from EMMA Yates. Rounding completed. Patient observed. Lying in supine position - eyes closed - non-labored breathing noted.     Martha Bojorquez  6/27/2018  2:15 AM

## 2018-06-27 NOTE — PLAN OF CARE
Face to face end of shift report received from Martha BONILLA RN. Rounding completed. Patient observed in [facility].     Rachel Alejo  6/27/2018  9:43 AM

## 2018-06-28 LAB
ALBUMIN SERPL-MCNC: 4.1 G/DL (ref 3.4–5)
ALP SERPL-CCNC: 68 U/L (ref 40–150)
ALT SERPL W P-5'-P-CCNC: 22 U/L (ref 0–70)
ANION GAP SERPL CALCULATED.3IONS-SCNC: 6 MMOL/L (ref 3–14)
AST SERPL W P-5'-P-CCNC: 9 U/L (ref 0–45)
BILIRUB SERPL-MCNC: 0.2 MG/DL (ref 0.2–1.3)
BUN SERPL-MCNC: 20 MG/DL (ref 7–30)
CALCIUM SERPL-MCNC: 9.5 MG/DL (ref 8.5–10.1)
CHLORIDE SERPL-SCNC: 109 MMOL/L (ref 94–109)
CO2 SERPL-SCNC: 25 MMOL/L (ref 20–32)
CREAT SERPL-MCNC: 0.84 MG/DL (ref 0.66–1.25)
GFR SERPL CREATININE-BSD FRML MDRD: >90 ML/MIN/1.7M2
GLUCOSE SERPL-MCNC: 128 MG/DL (ref 70–99)
POTASSIUM SERPL-SCNC: 4.3 MMOL/L (ref 3.4–5.3)
PROT SERPL-MCNC: 7 G/DL (ref 6.8–8.8)
SODIUM SERPL-SCNC: 140 MMOL/L (ref 133–144)

## 2018-06-28 PROCEDURE — 36415 COLL VENOUS BLD VENIPUNCTURE: CPT | Performed by: NURSE PRACTITIONER

## 2018-06-28 PROCEDURE — 25000132 ZZH RX MED GY IP 250 OP 250 PS 637: Performed by: NURSE PRACTITIONER

## 2018-06-28 PROCEDURE — 80053 COMPREHEN METABOLIC PANEL: CPT | Performed by: NURSE PRACTITIONER

## 2018-06-28 PROCEDURE — 12400000 ZZH R&B MH

## 2018-06-28 PROCEDURE — 99232 SBSQ HOSP IP/OBS MODERATE 35: CPT | Performed by: NURSE PRACTITIONER

## 2018-06-28 RX ORDER — NICOTINE 21 MG/24HR
1 PATCH, TRANSDERMAL 24 HOURS TRANSDERMAL DAILY
Status: DISCONTINUED | OUTPATIENT
Start: 2018-06-28 | End: 2018-07-03 | Stop reason: HOSPADM

## 2018-06-28 RX ADMIN — HYDROXYZINE HYDROCHLORIDE 50 MG: 25 TABLET ORAL at 08:07

## 2018-06-28 RX ADMIN — NICOTINE POLACRILEX 4 MG: 2 GUM, CHEWING ORAL at 20:19

## 2018-06-28 RX ADMIN — NICOTINE POLACRILEX 4 MG: 2 GUM, CHEWING ORAL at 08:07

## 2018-06-28 RX ADMIN — OLANZAPINE 10 MG: 10 TABLET ORAL at 17:14

## 2018-06-28 RX ADMIN — NICOTINE POLACRILEX 4 MG: 2 GUM, CHEWING ORAL at 12:16

## 2018-06-28 RX ADMIN — NICOTINE 1 PATCH: 14 PATCH, EXTENDED RELEASE TRANSDERMAL at 15:47

## 2018-06-28 RX ADMIN — QUETIAPINE 600 MG: 300 TABLET, FILM COATED ORAL at 20:18

## 2018-06-28 RX ADMIN — HYDROXYZINE HYDROCHLORIDE 50 MG: 25 TABLET ORAL at 12:16

## 2018-06-28 ASSESSMENT — ACTIVITIES OF DAILY LIVING (ADL)
LAUNDRY: UNABLE TO COMPLETE
DRESS: SCRUBS (BEHAVIORAL HEALTH)
ORAL_HYGIENE: INDEPENDENT
GROOMING: INDEPENDENT

## 2018-06-28 NOTE — PLAN OF CARE
Face to face end of shift report received from Sabine BRIDGES RN. Rounding completed. Patient observed in [facility].     Laura Pedroza  6/28/2018  7:42 AM

## 2018-06-28 NOTE — PLAN OF CARE
Problem: Patient Care Overview  Goal: Individualization & Mutuality  Patient will remain independent with ADLs daily.   Patient will sleep 6-8 hours a night.  Patient will participate in 50% or more of unit programming.    Outcome: Improving  Patient has been calm, cooperative, and medication compliant this shift.  He states he is feeling better but is still having a lot of anxiety and feels like he just can't slow down.  He took Atarax 50 mg at 0810 and 1218 with good relief of symptoms.  He denies any other mental health issues.  He has attended groups and is social with peers.  Is showing good boundaries today.  No complaints of pain.  VS WNL.      Problem: Thought Process Alteration (Adult)  Goal: Improved Thought Process  Patient will verbalize a reduction/resolution in hallucinations and delusional thinking by 6/28/18.     Outcome: Improving  Patient denies hallucinations this shift.  He states he is still having intrusive and racing thoughts but does not blurt out comments.  He has not made any sexual comments this shift.

## 2018-06-28 NOTE — PLAN OF CARE
"Problem: Patient Care Overview  Goal: Individualization & Mutuality  Patient will remain independent with ADLs daily.   Patient will sleep 6-8 hours a night.  Patient will participate in 50% or more of unit programming.    Patient friendly and cooperative with assessment. Denies depression, SI/HI and pain this shift. When asked about hallucinations patient states, \"not right now, but I guess I don't always know for sure\". This writer did not observe patient responding to internal stimuli this shift.   1714- Requesting PRN for anxiety rated 8/10 stating, \"the one I got last night, the one that helps with voices too\". Received PRN Zyprexa 10 mg.   Showered shortly after and back in day room with peers and attending groups this evening. Compliant with scheduled medications. Conversation with this writer has been appropriate all shift. In bed resting by 2130 for remainder of shift. Continue to monitor at this time.     Problem: Thought Process Alteration (Adult)  Goal: Identify Related Risk Factors and Signs and Symptoms  Related risk factors and signs and symptoms are identified upon initiation of Human Response Clinical Practice Guideline (CPG).   Continue to monitor at this time.   Goal: Improved Thought Process  Patient will verbalize a reduction/resolution in hallucinations and delusional thinking by 6/28/18.     Continue to monitor at this time.       "

## 2018-06-28 NOTE — PLAN OF CARE
Face to face end of shift report received from Laura LYNN RN. Rounding completed. Patient observed participating in group at this time.     Tiff Schulz  6/28/2018  5:49 PM

## 2018-06-28 NOTE — PLAN OF CARE
Face to face end of shift report received from Trudy CARTER. Rounding completed. Patient observed.     Sabine Lopez  6/27/2018  11:41 PM

## 2018-06-28 NOTE — PLAN OF CARE
"Problem: Patient Care Overview  Goal: Individualization & Mutuality  Patient will remain independent with ADLs daily.   Patient will sleep 6-8 hours a night.  Patient will participate in 50% or more of unit programming.    Outcome: No Change  Patient is up on the unit and attending groups. Patient continues to have \"bad thoughts\" and after supper meal he asked for and received Hydroxyzine 50 mg po for anxiety. Patient ate well. Denies any physical pain or issues. Denies having any depression or thoughts of suicide or harm to others. Patient encouraged to participate in groups and he agreed to do so. At 1930 patient requested and was given Zyprexa 10 mg and stated he felt very anxious about having \"bad thoughts\" or \"impure thoughts.\"  Patient feels the medication has helped him feel calmer and keeps the thoughts reduced.     Problem: Thought Process Alteration (Adult)  Goal: Improved Thought Process  Patient will verbalize a reduction/resolution in hallucinations and delusional thinking by 6/28/18.     Outcome: No Change  Patient states he still is having \"bad thoughts\" but he appears calmer and tells writer he was able to get a good night sleep last night.      "

## 2018-06-28 NOTE — PLAN OF CARE
"Problem: Patient Care Overview  Goal: Discharge Needs Assessment  Outcome: Improving  Spoke with pt this afternoon- he states he has been having trouble sleeping and is trying to rest this afternoon. Says the provider talked with him about medication changes today and he doesn't want to discharge home until he feels stable on the medications. States he went to some group programming this morning but that it only helps \"a little\" because he is used to keeping tract of many things. Says his mind is \"grabbing onto things it shouldn't be\" Asked pt what he meant by this and he says \"spitiuality.\" States he hasn't actually gone to [employer] for many years because he feels it is \"almost cultish.\" Says since being here he feels \"calmer\" but his brain has \"too much free time.\" Talked with pt about reading, working on puzzles, coloring, etc. To keep self busy.       "

## 2018-06-28 NOTE — PLAN OF CARE
Problem: Patient Care Overview  Goal: Team Discussion  Team Plan:   BEHAVIORAL TEAM DISCUSSION    Participants: Shreya Hay NP,Rafael Wilkinson NP, Kirstie Wilkinson NP,  Henna Queen LICSW, Shelby Rich LSW,  Hailee Herbert LSW, Toya Leach RN, Laura Pedroza RN  Progress: Minimal. Sleeping, no hallucinations  Continued Stay Criteria/Rationale: Add lithium if agreeable  Medical/Physical: NA  Precautions:   Behavioral Orders   Procedures     Code 1 - Restrict to Unit     Routine Programming     As clinically indicated     Self Injury Precaution     Bathroom door to remain locked until after provider evaluation     Sexual precautions     Status 15     Every 15 minutes.     Plan: Stabilize and then return home  Rationale for change in precautions or plan: NA

## 2018-06-28 NOTE — PLAN OF CARE
Problem: Patient Care Overview  Goal: Individualization & Mutuality  Patient will remain independent with ADLs daily.   Patient will sleep 6-8 hours a night.  Patient will participate in 50% or more of unit programming.    Pt appeared to be sleeping most of this shift, normal respirations and position changes noted.

## 2018-06-29 PROCEDURE — 25000132 ZZH RX MED GY IP 250 OP 250 PS 637: Performed by: NURSE PRACTITIONER

## 2018-06-29 PROCEDURE — 12400000 ZZH R&B MH

## 2018-06-29 RX ORDER — LITHIUM CARBONATE 300 MG/1
300 TABLET, FILM COATED, EXTENDED RELEASE ORAL AT BEDTIME
Status: DISCONTINUED | OUTPATIENT
Start: 2018-06-29 | End: 2018-07-01

## 2018-06-29 RX ORDER — QUETIAPINE FUMARATE 25 MG/1
25 TABLET, FILM COATED ORAL 3 TIMES DAILY PRN
Status: DISCONTINUED | OUTPATIENT
Start: 2018-06-29 | End: 2018-07-03 | Stop reason: HOSPADM

## 2018-06-29 RX ADMIN — NICOTINE POLACRILEX 4 MG: 2 GUM, CHEWING ORAL at 12:11

## 2018-06-29 RX ADMIN — NICOTINE POLACRILEX 4 MG: 2 GUM, CHEWING ORAL at 19:12

## 2018-06-29 RX ADMIN — LITHIUM CARBONATE 300 MG: 300 TABLET, EXTENDED RELEASE ORAL at 20:58

## 2018-06-29 RX ADMIN — HYDROXYZINE HYDROCHLORIDE 50 MG: 25 TABLET ORAL at 17:40

## 2018-06-29 RX ADMIN — HYDROXYZINE HYDROCHLORIDE 50 MG: 25 TABLET ORAL at 12:11

## 2018-06-29 RX ADMIN — QUETIAPINE 600 MG: 300 TABLET, FILM COATED ORAL at 20:58

## 2018-06-29 RX ADMIN — NICOTINE POLACRILEX 4 MG: 2 GUM, CHEWING ORAL at 15:53

## 2018-06-29 RX ADMIN — OLANZAPINE 10 MG: 10 TABLET ORAL at 07:41

## 2018-06-29 RX ADMIN — QUETIAPINE FUMARATE 25 MG: 25 TABLET ORAL at 15:53

## 2018-06-29 RX ADMIN — NICOTINE 1 PATCH: 14 PATCH, EXTENDED RELEASE TRANSDERMAL at 08:26

## 2018-06-29 RX ADMIN — NICOTINE POLACRILEX 4 MG: 2 GUM, CHEWING ORAL at 06:44

## 2018-06-29 RX ADMIN — NICOTINE POLACRILEX 4 MG: 2 GUM, CHEWING ORAL at 07:41

## 2018-06-29 ASSESSMENT — ACTIVITIES OF DAILY LIVING (ADL)
ORAL_HYGIENE: INDEPENDENT
GROOMING: INDEPENDENT
DRESS: SCRUBS (BEHAVIORAL HEALTH)
GROOMING: INDEPENDENT
LAUNDRY: UNABLE TO COMPLETE

## 2018-06-29 NOTE — PLAN OF CARE
Face to face end of shift report received from Sabine MERCER RN. Rounding completed. Patient observed in [facility].     Rachel Alejo  6/29/2018  7:52 AM

## 2018-06-29 NOTE — PLAN OF CARE
Problem: Patient Care Overview  Goal: Team Discussion  Team Plan:   BEHAVIORAL TEAM DISCUSSION    Participants:  Shreya Hay NP, Rafael Wilkinson NP, Kirstie Wilkinson NP, Henna RODRIGUEZSW, Chapis Michael SW intern, Andra Wade RN, Rachel Alejo RN, Yadi Drake OT, Jerilyn Kendall OT  Progress: fair, less delayed, isolating, reports less intrusive thoughts  Continued Stay Criteria/Rationale: start Lithium tonight, DC Zyprexa  Medical/Physical: None known at this time  Precautions:   Behavioral Orders   Procedures     Code 1 - Restrict to Unit     Routine Programming     As clinically indicated     Self Injury Precaution     Bathroom door to remain locked until after provider evaluation     Sexual precautions     Status 15     Every 15 minutes.     Plan: Stabilize and DC back home  Rationale for change in precautions or plan: None

## 2018-06-29 NOTE — PLAN OF CARE
"Problem: Patient Care Overview  Goal: Individualization & Mutuality  Patient will remain independent with ADLs daily.   Patient will sleep 6-8 hours a night.  Patient will participate in 50% or more of unit programming.    Outcome: No Change  Pt was lying in bed but got up to lounge after talking to writer. States he feels anxious and requested Seroquel - received 25 mg po prn. Denies hallucinations, suicidal thoughts or plans, depression or pain. Answers questions appropriately. Pleasant and cooperative. States sleep difficulties remain and \"maybe klonopin might help- my friend told me he takes that before bed\". No request to call NP at this time for order.  1705- pt spending time out in [facility] . States little anxiety  1740- medicated with Atarax 50 mg po for c/o anxiety. Stated the Seroquel \"didn't do much\".   1830- pt attended RN group and interacted with peers and staff. Contributed to conversation. Appears calm  "

## 2018-06-29 NOTE — PLAN OF CARE
"Problem: Patient Care Overview  Goal: Individualization & Mutuality  Patient will remain independent with ADLs daily.   Patient will sleep 6-8 hours a night.  Patient will participate in 50% or more of unit programming.    Outcome: No Change  Patient out on open unit for breakfast, then went back to bed. Admits to \"some anxiety and agitation, nothing too bad, but I still want something for it\", accepted Zyprexa 10 mg PO at 0741. Is more clear today and speak is less delayed. Is polite, has not made any sexually inappropriate comments to staff.   1211-requested and accepted Atarax 50 mg PO, states he feels he needs more Seroquel for sleep, that he wakes up during the night. States when he was at home he would take more, \"I would break one in half and take more to sleep at night\". Was encouraged to take the PRN dose of Seroquel or Atarax at bedtime to help aid in sleeping continuously through the night.     Problem: Thought Process Alteration (Adult)  Goal: Improved Thought Process  Patient will verbalize a reduction/resolution in hallucinations and delusional thinking by 6/28/18.     Outcome: No Change  Patient denies hallucinations, does not appear to be responding to internal stimuli. Has not made any delusional statements this shift.       "

## 2018-06-29 NOTE — PLAN OF CARE
Face to face end of shift report received from Tiff CARTER. Rounding completed. Patient observed.     Sabine Lopez  6/29/2018  12:02 AM

## 2018-06-29 NOTE — PLAN OF CARE
Face to face end of shift report received from Rachel CARTER. Rounding completed. Patient observed.     Velma Holt  6/29/2018  3:29 PM

## 2018-06-30 PROCEDURE — 25000132 ZZH RX MED GY IP 250 OP 250 PS 637: Performed by: NURSE PRACTITIONER

## 2018-06-30 PROCEDURE — 12400000 ZZH R&B MH

## 2018-06-30 PROCEDURE — 99232 SBSQ HOSP IP/OBS MODERATE 35: CPT | Performed by: NURSE PRACTITIONER

## 2018-06-30 RX ADMIN — QUETIAPINE FUMARATE 25 MG: 25 TABLET ORAL at 00:07

## 2018-06-30 RX ADMIN — QUETIAPINE FUMARATE 25 MG: 25 TABLET ORAL at 08:07

## 2018-06-30 RX ADMIN — HYDROXYZINE HYDROCHLORIDE 50 MG: 25 TABLET ORAL at 18:00

## 2018-06-30 RX ADMIN — HYDROXYZINE HYDROCHLORIDE 50 MG: 25 TABLET ORAL at 10:59

## 2018-06-30 RX ADMIN — NICOTINE POLACRILEX 4 MG: 2 GUM, CHEWING ORAL at 18:00

## 2018-06-30 RX ADMIN — QUETIAPINE FUMARATE 25 MG: 25 TABLET ORAL at 13:45

## 2018-06-30 RX ADMIN — LITHIUM CARBONATE 300 MG: 300 TABLET, EXTENDED RELEASE ORAL at 20:50

## 2018-06-30 RX ADMIN — QUETIAPINE FUMARATE 800 MG: 200 TABLET, EXTENDED RELEASE ORAL at 20:49

## 2018-06-30 RX ADMIN — NICOTINE POLACRILEX 4 MG: 2 GUM, CHEWING ORAL at 10:59

## 2018-06-30 RX ADMIN — NICOTINE 1 PATCH: 14 PATCH, EXTENDED RELEASE TRANSDERMAL at 08:07

## 2018-06-30 ASSESSMENT — ACTIVITIES OF DAILY LIVING (ADL)
ORAL_HYGIENE: INDEPENDENT
GROOMING: INDEPENDENT
DRESS: SCRUBS (BEHAVIORAL HEALTH)
LAUNDRY: UNABLE TO COMPLETE

## 2018-06-30 NOTE — PLAN OF CARE
Face to face end of shift report received from Velma CARTER. Rounding completed. Patient observed.     Sabine Lopez  6/29/2018  11:30 PM

## 2018-06-30 NOTE — PLAN OF CARE
"Problem: Patient Care Overview  Goal: Individualization & Mutuality  Patient will remain independent with ADLs daily.   Patient will sleep 6-8 hours a night.  Patient will participate in 50% or more of unit programming.    Patient in day room with peers and attending groups appropriately all shift. Socializes with peers more than previous shifts with this writer. Full range affect, clear speech and making eye contact during conversation with this writer. Denies depression, SI/HI, hallucinations and pain this shift. Continues to report anxiety stating, \"it's getting better little by little\".   1800- Requested/Received PRN Atarax 50 mg. Talked to this writer about recent medication changes and utilizing coping skills.   Compliant with scheduled medications. Laying in bed to rest by 2130 for remainder of shift. Continue to monitor at this time.      Problem: Thought Process Alteration (Adult)  Goal: Identify Related Risk Factors and Signs and Symptoms  Related risk factors and signs and symptoms are identified upon initiation of Human Response Clinical Practice Guideline (CPG).   Continue to monitor at this time.   Goal: Improved Thought Process  Patient will verbalize a reduction/resolution in hallucinations and delusional thinking by 6/28/18.     Continue to monitor at this time.       "

## 2018-06-30 NOTE — PROGRESS NOTES
"[facility]  Psychiatric Progress Note      Impression:     Hung Santiago is a 32 year old male who presented via via [facility] ED with reports of acute psychosis. [facility] police after father called them reporting Protestant preoccupation. In the ED he was claiming to be God and that there were demons inside of him needing to be struck down. He did leave his house the evening prior to take a run and was found by police running around the neighborhood naked.    Continues to report that he is \"catching up on sleep.\"  He does not feel that the dose of Lithium has made any difference yet. Has been utilizing a lot of PRNs during the day. Is agreeable to changing Seroquel to XR formula and increasing to 800mg at bed to hopefully decrease his daytime symptoms. Reassured him that PRNs will still be available. Continues to report trouble with sleep at bed; however, nursing reported he slept most of the night. Reports ongoing intrusive thoughts but has maintained appropriate boundaries and is no longer verbalizing these thoughts. Denies SI/HI or hallucinations.          DIagnoses:     Psychosis, NOS  Insomnia, NOS  Cannabis abuse, with intoxication, r/o with induced perceptual disorder    Attestation:  Patient has been seen and evaluated by me,  Rafael Wilkinson NP          Interim History:   The patient's care was discussed with the treatment team and chart notes were reviewed.          Medications:   I have reviewed this patient's current medications    Prescription Medications as of 6/30/2018             QUEtiapine Fumarate (SEROQUEL PO) Take 300 mg by mouth At Bedtime      Facility Administered Medications as of 6/30/2018             acetaminophen (TYLENOL) tablet 650 mg Take 2 tablets (650 mg) by mouth every 4 hours as needed for mild pain    alum & mag hydroxide-simethicone (MYLANTA ES/MAALOX  ES) suspension 30 mL Take 30 mLs by mouth every 4 hours as needed for indigestion    hydrOXYzine (ATARAX) " "tablet 25-50 mg Take 1-2 tablets (25-50 mg) by mouth every 4 hours as needed for anxiety    lithium (ESKALITH/LITHOBID) CR tablet 300 mg Take 1 tablet (300 mg) by mouth At Bedtime    magnesium hydroxide (MILK OF MAGNESIA) suspension 30 mL Take 30 mLs by mouth nightly as needed for constipation    nicotine (NICODERM CQ) 14 MG/24HR 24 hr patch 1 patch Place 1 patch onto the skin daily    nicotine Patch in Place Place onto the skin every 8 hours    nicotine patch REMOVAL Place onto the skin daily    nicotine polacrilex (NICORETTE) gum 2-4 mg Place 1-2 each (2-4 mg) inside cheek every hour as needed for other (nicotine withdrawal symptoms)    QUEtiapine (SEROquel) tablet 25 mg Take 1 tablet (25 mg) by mouth 3 times daily as needed (anxiety, agitation, racing thoughts or hallucinations)    QUEtiapine (SEROquel) tablet 600 mg Take 2 tablets (600 mg) by mouth At Bedtime          10 point ROS reviewed with no new report       Allergies:     Allergies   Allergen Reactions     Neosporin [Neomycin-Polymyxin-Gramicidin]             Psychiatric Examination:   /65  Pulse 66  Temp 97.3  F (36.3  C) (Tympanic)  Resp 16  Ht 1.753 m (5' 9\")  Wt 66.3 kg (146 lb 1.6 oz)  SpO2 98%  BMI 21.58 kg/m2  Weight is 146 lbs 1.6 oz  Body mass index is 21.58 kg/(m^2).    Appearance: awake, alert, still in bed  Attitude:  Pleasant, cooperative  Eye Contact: appropriate  Mood:  improving  Affect:  congruent  Speech:  Normal volume, rate and content  Psychomotor Behavior:  no evidence of tardive dyskinesia, dystonia, or tics  Thought Process: Logical, linear and goal oriented  Associations:  no loose associations  Thought Content:  no evidence of suicidal ideation or homicidal ideation and no evidence of psychotic thought; sexually preoccupied  Insight:  fair  Judgment:  fair  Oriented to:  time, person, and place  Attention Span and Concentration:  intact  Recent and Remote Memory:  intact  Fund of Knowledge: low-normal  Muscle " Strength and Tone: normal  Gait and Station: Normal           Labs:     Results for orders placed or performed during the hospital encounter of 06/25/18   TSH with free T4 reflex   Result Value Ref Range    TSH 0.98 0.40 - 4.00 mU/L   Comprehensive metabolic panel   Result Value Ref Range    Sodium 140 133 - 144 mmol/L    Potassium 4.3 3.4 - 5.3 mmol/L    Chloride 109 94 - 109 mmol/L    Carbon Dioxide 25 20 - 32 mmol/L    Anion Gap 6 3 - 14 mmol/L    Glucose 128 (H) 70 - 99 mg/dL    Urea Nitrogen 20 7 - 30 mg/dL    Creatinine 0.84 0.66 - 1.25 mg/dL    GFR Estimate >90 >60 mL/min/1.7m2    GFR Estimate If Black >90 >60 mL/min/1.7m2    Calcium 9.5 8.5 - 10.1 mg/dL    Bilirubin Total 0.2 0.2 - 1.3 mg/dL    Albumin 4.1 3.4 - 5.0 g/dL    Protein Total 7.0 6.8 - 8.8 g/dL    Alkaline Phosphatase 68 40 - 150 U/L    ALT 22 0 - 70 U/L    AST 9 0 - 45 U/L          Plan:   Continue Lithium CR 300mg at bed   Change Seroquel to XR and increase to 800mg at bed.  ELOS - another couple of nights secondary to ongoing intrusive thoughts and frequent requests for PRN medications throughout the day to ease anxiety related to thoughts.

## 2018-06-30 NOTE — PLAN OF CARE
Face to face end of shift report received from Rachel WALLS RN. Rounding completed. Patient observed participating in groups. No requests at this time.     Tiff Schulz  6/30/2018  4:11 PM

## 2018-06-30 NOTE — PLAN OF CARE
Face to face end of shift report received from Sabine MERCER RN. Rounding completed. Patient observed in [place].     Rachel Alejo  6/30/2018  8:18 AM

## 2018-06-30 NOTE — PLAN OF CARE
"Problem: Patient Care Overview  Goal: Individualization & Mutuality  Patient will remain independent with ADLs daily.   Patient will sleep 6-8 hours a night.  Patient will participate in 50% or more of unit programming.    Outcome: Improving  Patient out on open unit for breakfast, is withdrawn, does not engage in conversation with peers. Went back to bed after breakfast. Admits to anxiety, requested and accepted \"something for anxiety\", was given Seroquel 25 mg PO at 0807. Does like his PRN medications, states \"I'll take those two little pills also\", meaning the Atarax. Was encouraged to try his coping skills first, patient in agreement. States he is good, that he has \"no bad thoughts today\". Has been appropriate with staff and peers.   1059-requested and accepted Atarax 50 mg PO.  1345-requested and accepted Seroquel 25 mg PO, states \"I don't feel like the bedtime Seroquel is as effective, I just like to stay ahead of my anxiety, I can feel it going up\".    Problem: Thought Process Alteration (Adult)  Goal: Improved Thought Process  Patient will verbalize a reduction/resolution in hallucinations and delusional thinking by 6/28/18.     Outcome: Improving  Patient denies hallucinations, does not appear to be responding. Has not made any delusional statements this shift.       "

## 2018-07-01 PROCEDURE — 12400000 ZZH R&B MH

## 2018-07-01 PROCEDURE — 25000132 ZZH RX MED GY IP 250 OP 250 PS 637: Performed by: NURSE PRACTITIONER

## 2018-07-01 PROCEDURE — 99232 SBSQ HOSP IP/OBS MODERATE 35: CPT | Performed by: NURSE PRACTITIONER

## 2018-07-01 RX ORDER — LITHIUM CARBONATE 300 MG/1
300 TABLET, FILM COATED, EXTENDED RELEASE ORAL 2 TIMES DAILY
Status: DISCONTINUED | OUTPATIENT
Start: 2018-07-01 | End: 2018-07-03 | Stop reason: HOSPADM

## 2018-07-01 RX ADMIN — HYDROXYZINE HYDROCHLORIDE 50 MG: 25 TABLET ORAL at 12:20

## 2018-07-01 RX ADMIN — NICOTINE 1 PATCH: 14 PATCH, EXTENDED RELEASE TRANSDERMAL at 08:14

## 2018-07-01 RX ADMIN — HYDROXYZINE HYDROCHLORIDE 50 MG: 25 TABLET ORAL at 17:24

## 2018-07-01 RX ADMIN — HYDROXYZINE HYDROCHLORIDE 50 MG: 25 TABLET ORAL at 08:14

## 2018-07-01 RX ADMIN — NICOTINE POLACRILEX 4 MG: 2 GUM, CHEWING ORAL at 08:14

## 2018-07-01 RX ADMIN — NICOTINE POLACRILEX 4 MG: 2 GUM, CHEWING ORAL at 20:46

## 2018-07-01 RX ADMIN — QUETIAPINE FUMARATE 800 MG: 200 TABLET, EXTENDED RELEASE ORAL at 20:46

## 2018-07-01 RX ADMIN — NICOTINE POLACRILEX 4 MG: 2 GUM, CHEWING ORAL at 15:51

## 2018-07-01 RX ADMIN — LITHIUM CARBONATE 300 MG: 300 TABLET, EXTENDED RELEASE ORAL at 20:46

## 2018-07-01 RX ADMIN — NICOTINE POLACRILEX 4 MG: 2 GUM, CHEWING ORAL at 17:24

## 2018-07-01 RX ADMIN — QUETIAPINE FUMARATE 25 MG: 25 TABLET ORAL at 15:51

## 2018-07-01 RX ADMIN — NICOTINE POLACRILEX 4 MG: 2 GUM, CHEWING ORAL at 12:20

## 2018-07-01 ASSESSMENT — ACTIVITIES OF DAILY LIVING (ADL)
LAUNDRY: UNABLE TO COMPLETE
GROOMING: INDEPENDENT
ORAL_HYGIENE: INDEPENDENT
DRESS: SCRUBS (BEHAVIORAL HEALTH)

## 2018-07-01 NOTE — PLAN OF CARE
"Problem: Patient Care Overview  Goal: Individualization & Mutuality  Patient will remain independent with ADLs daily.   Patient will sleep 6-8 hours a night.  Patient will participate in 50% or more of unit programming.      Patient in day room with peers in beginning of shift, rapidly tapping feet and reporting anxiety rated 7/10.  1551- Requested/Received PRN Seroquel 25 mg.   Patient pacing hallways until dinner, reporting continued anxiety stating \"I'm trying to work through it. There are a lot of people here right now and it's really hard not being able to go outside\".   1724- Requested/Received PRN Atarax 50 mg.   Patient appeared to have some relief after this, attending groups appropriately. Denies all other criteria outside of anxiety. Clear speech, making eye contact and appropriate during all conversations with this writer. Compliant with scheduled medications this evening. In bed resting by 2130 for remainder of shift. Continue to monitor at this time.     Problem: Thought Process Alteration (Adult)  Goal: Identify Related Risk Factors and Signs and Symptoms  Related risk factors and signs and symptoms are identified upon initiation of Human Response Clinical Practice Guideline (CPG).   Continue to monitor at this time.   Goal: Improved Thought Process  Patient will verbalize a reduction/resolution in hallucinations and delusional thinking by 6/28/18.     Continue to monitor at this time.       "

## 2018-07-01 NOTE — PLAN OF CARE
"Problem: Patient Care Overview  Goal: Individualization & Mutuality  Patient will remain independent with ADLs daily.   Patient will sleep 6-8 hours a night.  Patient will participate in 50% or more of unit programming.    Outcome: Improving  Patient pleasant and cooperative with assessment questioning. Affect is full range, mood is calm. Admits to anxiety, did accepted Atarax 50 mg PO at 0814. States he did not sleep well last night, that he woke up 6 times, and was dreaming about computer war games. Is more clear in conversation. Went to bed after breakfast. Has been attending groups, and has appropriate with staff and peers.   1220-requested and accepted Atarax 50 mg PO, states \"I'm at the point where I'll take anything you can give me, I'm still having wandering thoughts\".    Problem: Thought Process Alteration (Adult)  Goal: Improved Thought Process  Patient will verbalize a reduction/resolution in hallucinations and delusional thinking by 6/28/18.     Outcome: Improving  Patient does not appear to be responding to internal stimuli and is less delusional in conversation, is more clear.      "

## 2018-07-01 NOTE — PROGRESS NOTES
[facility]  Psychiatric Progress Note      Impression:     Hung Santiago is a 32 year old male who presented via via [facility] ED with reports of acute psychosis. [facility] police after father called them reporting Worship preoccupation. In the ED he was claiming to be God and that there were demons inside of him needing to be struck down. He did leave his house the evening prior to take a run and was found by police running around the neighborhood naked.    Ongoing complaints of poor sleep, anxiety and intrusive thoughts. Repeated requests for PRNs. Indicates this is because he gets anxious around a lot of people. He has been going to groups, appears brighter, makes no inappropriate comments, and is agreeable to increasing Lithium.          DIagnoses:     Psychosis, NOS  Insomnia, NOS  Cannabis abuse, with intoxication, r/o with induced perceptual disorder    Attestation:  Patient has been seen and evaluated by me,  Rafael Wilkinson NP          Interim History:   The patient's care was discussed with the treatment team and chart notes were reviewed.          Medications:   I have reviewed this patient's current medications    Prescription Medications as of 7/1/2018             QUEtiapine Fumarate (SEROQUEL PO) Take 300 mg by mouth At Bedtime      Facility Administered Medications as of 7/1/2018             acetaminophen (TYLENOL) tablet 650 mg Take 2 tablets (650 mg) by mouth every 4 hours as needed for mild pain    alum & mag hydroxide-simethicone (MYLANTA ES/MAALOX  ES) suspension 30 mL Take 30 mLs by mouth every 4 hours as needed for indigestion    hydrOXYzine (ATARAX) tablet 25-50 mg Take 1-2 tablets (25-50 mg) by mouth every 4 hours as needed for anxiety    lithium (ESKALITH/LITHOBID) CR tablet 300 mg Take 1 tablet (300 mg) by mouth 2 times daily    magnesium hydroxide (MILK OF MAGNESIA) suspension 30 mL Take 30 mLs by mouth nightly as needed for constipation    nicotine (NICODERM CQ) 14  "MG/24HR 24 hr patch 1 patch Place 1 patch onto the skin daily    nicotine Patch in Place Place onto the skin every 8 hours    nicotine patch REMOVAL Place onto the skin daily    nicotine polacrilex (NICORETTE) gum 2-4 mg Place 1-2 each (2-4 mg) inside cheek every hour as needed for other (nicotine withdrawal symptoms)    QUEtiapine (SEROquel XR) 24 hr tablet 800 mg Take 800 mg by mouth At Bedtime    QUEtiapine (SEROquel) tablet 25 mg Take 1 tablet (25 mg) by mouth 3 times daily as needed (anxiety, agitation, racing thoughts or hallucinations)    lithium (ESKALITH/LITHOBID) CR tablet 300 mg (Discontinued) Take 1 tablet (300 mg) by mouth At Bedtime          10 point ROS reviewed with no new report       Allergies:     Allergies   Allergen Reactions     Neosporin [Neomycin-Polymyxin-Gramicidin]             Psychiatric Examination:   /86  Pulse 98  Temp 99.3  F (37.4  C) (Tympanic)  Resp 16  Ht 1.753 m (5' 9\")  Wt 74.2 kg (163 lb 9.6 oz)  SpO2 97%  BMI 24.16 kg/m2  Weight is 163 lbs 9.6 oz  Body mass index is 24.16 kg/(m^2).    Appearance: awake, alert, casually groomed  Attitude:  Pleasant, cooperative  Eye Contact: appropriate  Mood: notably improved but reports anxiety  Affect:  congruent  Speech:  Normal volume, rate and content  Psychomotor Behavior:  no evidence of tardive dyskinesia, dystonia, or tics  Thought Process: Logical, linear and goal oriented  Associations:  no loose associations  Thought Content:  no evidence of suicidal ideation or homicidal ideation and no evidence of psychotic thought; preoccupation improving  Insight:  fair  Judgment:  fair  Oriented to:  time, person, and place  Attention Span and Concentration:  intact  Recent and Remote Memory:  intact  Fund of Knowledge: low-normal  Muscle Strength and Tone: normal  Gait and Station: Normal           Labs:     Results for orders placed or performed during the hospital encounter of 06/25/18   TSH with free T4 reflex   Result Value " Ref Range    TSH 0.98 0.40 - 4.00 mU/L   Comprehensive metabolic panel   Result Value Ref Range    Sodium 140 133 - 144 mmol/L    Potassium 4.3 3.4 - 5.3 mmol/L    Chloride 109 94 - 109 mmol/L    Carbon Dioxide 25 20 - 32 mmol/L    Anion Gap 6 3 - 14 mmol/L    Glucose 128 (H) 70 - 99 mg/dL    Urea Nitrogen 20 7 - 30 mg/dL    Creatinine 0.84 0.66 - 1.25 mg/dL    GFR Estimate >90 >60 mL/min/1.7m2    GFR Estimate If Black >90 >60 mL/min/1.7m2    Calcium 9.5 8.5 - 10.1 mg/dL    Bilirubin Total 0.2 0.2 - 1.3 mg/dL    Albumin 4.1 3.4 - 5.0 g/dL    Protein Total 7.0 6.8 - 8.8 g/dL    Alkaline Phosphatase 68 40 - 150 U/L    ALT 22 0 - 70 U/L    AST 9 0 - 45 U/L          Plan:   Increase Lithium CR to 300mg twice daily   Continue Seroquel to XR  800mg at bed.  ELOS - another couple of nights secondary to ongoing intrusive thoughts and frequent requests for PRN medications throughout the day to ease anxiety related to thoughts.

## 2018-07-01 NOTE — PLAN OF CARE
Face to face end of shift report received from Rachel WALLS RN. Rounding completed. Patient observed socializing with peers in day room. Requests PRN for anxiety at this time.     Tiff Schulz  7/1/2018  4:28 PM

## 2018-07-01 NOTE — PLAN OF CARE
Face to face end of shift report received from Martha BONILLA RN. Rounding completed. Patient observed in room.     Rachel Alejo  7/1/2018  7:39 AM

## 2018-07-01 NOTE — PLAN OF CARE
Face to face end of shift report received from Tiff Schulz RN. Rounding completed. Patient observed.     Stefania Howard  7/1/2018  12:05 AM

## 2018-07-02 PROCEDURE — 99239 HOSP IP/OBS DSCHRG MGMT >30: CPT | Performed by: NURSE PRACTITIONER

## 2018-07-02 PROCEDURE — 25000132 ZZH RX MED GY IP 250 OP 250 PS 637: Performed by: NURSE PRACTITIONER

## 2018-07-02 PROCEDURE — 12400000 ZZH R&B MH

## 2018-07-02 RX ORDER — QUETIAPINE FUMARATE 25 MG/1
25 TABLET, FILM COATED ORAL 3 TIMES DAILY PRN
Qty: 60 TABLET | Refills: 0 | Status: SHIPPED | OUTPATIENT
Start: 2018-07-02

## 2018-07-02 RX ORDER — LITHIUM CARBONATE 300 MG/1
300 TABLET, FILM COATED, EXTENDED RELEASE ORAL 2 TIMES DAILY
Qty: 60 TABLET | Refills: 0 | Status: SHIPPED | OUTPATIENT
Start: 2018-07-02

## 2018-07-02 RX ORDER — QUETIAPINE FUMARATE 400 MG/1
800 TABLET, FILM COATED ORAL AT BEDTIME
Qty: 60 TABLET | Refills: 0 | Status: SHIPPED | OUTPATIENT
Start: 2018-07-02

## 2018-07-02 RX ORDER — HYDROXYZINE HYDROCHLORIDE 25 MG/1
25-50 TABLET, FILM COATED ORAL 2 TIMES DAILY PRN
Qty: 60 TABLET | Refills: 0 | Status: SHIPPED | OUTPATIENT
Start: 2018-07-02

## 2018-07-02 RX ADMIN — NICOTINE POLACRILEX 4 MG: 2 GUM, CHEWING ORAL at 15:58

## 2018-07-02 RX ADMIN — QUETIAPINE FUMARATE 25 MG: 25 TABLET ORAL at 00:02

## 2018-07-02 RX ADMIN — QUETIAPINE FUMARATE 800 MG: 200 TABLET, FILM COATED ORAL at 20:54

## 2018-07-02 RX ADMIN — NICOTINE 1 PATCH: 14 PATCH, EXTENDED RELEASE TRANSDERMAL at 08:40

## 2018-07-02 RX ADMIN — LITHIUM CARBONATE 300 MG: 300 TABLET, EXTENDED RELEASE ORAL at 20:53

## 2018-07-02 RX ADMIN — NICOTINE POLACRILEX 4 MG: 2 GUM, CHEWING ORAL at 13:42

## 2018-07-02 RX ADMIN — HYDROXYZINE HYDROCHLORIDE 50 MG: 25 TABLET ORAL at 13:42

## 2018-07-02 RX ADMIN — QUETIAPINE FUMARATE 25 MG: 25 TABLET ORAL at 15:58

## 2018-07-02 RX ADMIN — HYDROXYZINE HYDROCHLORIDE 50 MG: 25 TABLET ORAL at 00:02

## 2018-07-02 RX ADMIN — LITHIUM CARBONATE 300 MG: 300 TABLET, EXTENDED RELEASE ORAL at 08:39

## 2018-07-02 RX ADMIN — NICOTINE POLACRILEX 4 MG: 2 GUM, CHEWING ORAL at 21:00

## 2018-07-02 ASSESSMENT — ACTIVITIES OF DAILY LIVING (ADL)
ORAL_HYGIENE: INDEPENDENT
GROOMING: INDEPENDENT
DRESS: INDEPENDENT

## 2018-07-02 NOTE — PLAN OF CARE
Problem: Patient Care Overview  Goal: Team Discussion  Team Plan:   BEHAVIORAL TEAM DISCUSSION    Participants:  Shreya Hay NP, Rafael Wilkinson NP, Kirstie Wilkinson NP, Henna Queen LICSW, Chapis Michael SW intern, Andra Wade RN, Robina Merlos RN, Yadi Drake OT, Jerilyn Kendall OT  Progress: good, attending groups, calm  Continued Stay Criteria/Rationale: increased [place]  Medical/Physical: None known  Precautions:   Behavioral Orders   Procedures     Code 1 - Restrict to Unit     Routine Programming     As clinically indicated     Sexual precautions     Status 15     Every 15 minutes.     Plan: continue to stabilize and DC back home with services in place  Rationale for change in precautions or plan: None

## 2018-07-02 NOTE — PLAN OF CARE
Face to face end of shift report received from Tiff Schulz RN. Rounding completed. Patient observed.     Stefania Howard  7/2/2018  1:41 AM

## 2018-07-02 NOTE — PLAN OF CARE
Face to face end of shift report received from Hortensia Maharaj. Rounding completed. Patient observed in bed.     Robina Merlos  7/2/2018  8:00 AM

## 2018-07-02 NOTE — PLAN OF CARE
Problem: Patient Care Overview  Goal: Individualization & Mutuality  Patient will remain independent with ADLs daily.   Patient will sleep 6-8 hours a night.  Patient will participate in 50% or more of unit programming.    Outcome: Therapy, progress toward functional goals as expected  Pt denies SI HI hallucinations depression anxiety and pain.  He complains of poor sleep states it  Denies delusional thoughts. Appetite is good, and denies issues with B/B. He is encouraged to participate in unit programming, he is independent with ADL's . He did attend some unit programming this shift.     1345:  atarax administered for complaints of anxiety.     Problem: Thought Process Alteration (Adult)  Goal: Improved Thought Process  Patient will verbalize a reduction/resolution in hallucinations and delusional thinking by 6/28/18.     Outcome: Therapy, progress toward functional goals as expected  Pt denies Hallucinations, and delusional thoughts at this time.    1505:  Bag lunch ordered for patient he is discharging tomorrow AM at 10 AM via Taxi.  Pt reported atarax helped with his anxiety

## 2018-07-02 NOTE — DISCHARGE SUMMARY
"Psychiatric Discharge Summary    Hung Santiago MRN# [id number]   Age: 32 year old YOB: 1985     Date of Admission:  6/25/2018  Date of Discharge:  7/3/2018  Admitting Physician:  Archie Hoskins MD  Discharge Physician:  Rafael Wilkinson NP          Event Leading to Hospitalization and Hospital Stay   HPI  Hung Santiago is a 32 year old male who presented via via [facility] ED with reports of acute psychosis. BIB police after father called them reporting Oriental orthodox preoccupation. In the ED he was claiming to be God and that there were demons inside of him needing to be struck down. He did leave his house the evening prior to take a run and was found by police running around the neighborhood naked. Patient's father reported that Hung has been taking an entire bottle of Iodine daily and was concerned this was contributing to symptoms. No history of hashimoto's, so it is unlikely that he would be impacted by increased iodine intake. No TSH in ED, so this was ordered upon admission. Previous hospitalization in April 2018 for similar presentation. Admitted and started on /zyprexa, which was then changed to Seroquel 600mg. He reported in the ED that he ran out of the medication 2 weeks prior. Reported lack of sleep for \"a month.\"   Utox positive for THC and benzodiazepines. Labs indicate some mild dehydration.     Hung is much clearer today, having slept last night, and denies ongoing auditory hallucinations. He does recognize that he was experiencing odd thoughts and behavior and questions why this is happening. He believes that he started having issues about a year ago when he stopped drinking, which resulted in persistent insomnia. In April, this insomnia led him to relapse and smoke marijuana, with the hope of sleeping, but it made the sleep issue worse and possibly triggered the first psychotic episode. Most recently, having run out of Seroquel, he struggled with sleep again and tried some " "of his sister's Valium and marijuana, which he believes also made \"everything\" worse, including the psychosis. He is worried about how to move forward in life and indicates that his sleep issues have been so bad that he had to quit his job. He denies depression or anxiety outside of frustration with inability to sleep. He reports being exhausted but unable to sleep when he tries. Denies hyperactivity, pressured speech, excessive productivity, or out of character behaviors during these disruptive sleep periods; however, once he becomes psychotic, he does appear to become hyper-fixated on interpersonal relations and is sexually inappropriate. He also recognizes this. At one point he indicates that he has a \"silly question,\" and asks, \"do any of you want to go on a date?\"      Hung denies any family history of mental illness; however, later while discussing his mother, describing her \"irresponsibility with finances,\" moving to [place], and abruptly threatening to move his sister who has cerebral palsy into a \"home,\" unless she found a place to live, is suspicious.        Stay:  Admitted to unit voluntarily. Routine labs positive for THC and benzodiazepines. Monitored for improvement in sleep, cessation of psychotic symptoms and response to prescribed treatment. Provided a safe environment and therapeutic milieu. Initially just resumed Seroquel at 600mg at bed. Once he was sleeping some, psychosis cleared rapidly. He did continue to struggle with intrusive thoughts, which were possibly thought to be a part of edwin, so Lithium was added. This did seem to help some, but then he began to complain of poor sleep again. Seroquel changed to XR and increased to 800mg. Has struggled with sleep issues since alcohol cessation. Also increased Lithium to 300mg twice daily. He did sleep a lot during day time hours and had to be encouraged to get out of bed and attend groups. He felt the XR formula of seroquel made his sleep " worse, so this was changed back to IR. A Lithium level will be drawn in the AM prior to discharge.     Our team has made contact with his father to ensure readiness for discharge.     At time of discharge, there is no evidence that patient is in immediate danger of self or others.        DIagnoses:     Psychosis, NOS  Insomnia, NOS  Cannabis abuse, with intoxication, r/o with induced perceptual disorder  R/O Bipolar Disorder         Labs:     Results for orders placed or performed during the hospital encounter of 06/25/18   TSH with free T4 reflex   Result Value Ref Range    TSH 0.98 0.40 - 4.00 mU/L   Comprehensive metabolic panel   Result Value Ref Range    Sodium 140 133 - 144 mmol/L    Potassium 4.3 3.4 - 5.3 mmol/L    Chloride 109 94 - 109 mmol/L    Carbon Dioxide 25 20 - 32 mmol/L    Anion Gap 6 3 - 14 mmol/L    Glucose 128 (H) 70 - 99 mg/dL    Urea Nitrogen 20 7 - 30 mg/dL    Creatinine 0.84 0.66 - 1.25 mg/dL    GFR Estimate >90 >60 mL/min/1.7m2    GFR Estimate If Black >90 >60 mL/min/1.7m2    Calcium 9.5 8.5 - 10.1 mg/dL    Bilirubin Total 0.2 0.2 - 1.3 mg/dL    Albumin 4.1 3.4 - 5.0 g/dL    Protein Total 7.0 6.8 - 8.8 g/dL    Alkaline Phosphatase 68 40 - 150 U/L    ALT 22 0 - 70 U/L    AST 9 0 - 45 U/L          Discharge Medications:     Current Discharge Medication List      START taking these medications    Details   hydrOXYzine (ATARAX) 25 MG tablet Take 1-2 tablets (25-50 mg) by mouth 2 times daily as needed for anxiety  Qty: 60 tablet, Refills: 0    Associated Diagnoses: Anxiety      lithium (ESKALITH/LITHOBID) 300 MG CR tablet Take 1 tablet (300 mg) by mouth 2 times daily  Qty: 60 tablet, Refills: 0    Comments: Needs Lithium level checked at PCP appointment on 7/6/18.  Associated Diagnoses: Episodic mood disorder (H)         CONTINUE these medications which have CHANGED    Details   !! QUEtiapine (SEROQUEL) 25 MG tablet Take 1 tablet (25 mg) by mouth 3 times daily as needed (anxiety, agitation,  "racing thoughts or hallucinations)  Qty: 60 tablet, Refills: 0    Associated Diagnoses: Psychosis, unspecified psychosis type      !! QUEtiapine (SEROQUEL) 400 MG tablet Take 2 tablets (800 mg) by mouth At Bedtime  Qty: 60 tablet, Refills: 0    Associated Diagnoses: Psychosis, unspecified psychosis type       !! - Potential duplicate medications found. Please discuss with provider.             Psychiatric Examination:   Appearance:  awake, alert  Attitude:  cooperative  Eye Contact:  fair  Mood:  better  Affect:  mood congruent  Speech:  clear, coherent  Psychomotor Behavior:  no evidence of tardive dyskinesia, dystonia, or tics  Thought Process:  logical, linear and goal oriented  Associations:  no loose associations  Thought Content:  no evidence of suicidal ideation or homicidal ideation and no evidence of psychotic thought  Insight:  fair  Judgment:  fair  Oriented to:  time, person, and place  Attention Span and Concentration:  intact  Recent and Remote Memory:  intact  Fund of Knowledge: low normal  Muscle Strength and Tone: normal  Gait and Station: Normal         Discharge Plan:   Discharge home with father. Transportation arranged.     Psychiatry Follow-up:         [facility]  - Sabine Baca- As arranged   [address] [address]  Phone: [phone]     [facility]  PCP- Dr. Garcia- July 6th @ 11:15am   Will need Lithium level drawn.  [address] [place] [address]    Phone\"[phone]   Fax:[phone]      [facility] Psychiatry   Medication Management- Dr. Jesus - July 25th @ 8:00am    [address] [place] [address]  Phone: [phone]  Fax:      [facility]- Diagnostic assessment - July 19th @ 9:15am   [address] [place], [place] [address] [phone]  Phone: [phone]  Fax: [phone]      Attestation:  The patient has been seen and evaluated by me,  Rafael Wilkinson NP         Discharge Services " Provided:    35 minutes spent on discharge services, including:  Final examination of patient.  Review and discussion of Hospital stay.  Instructions for continued outpatient care/goals.  Preparation of discharge records.  Preparation of medications refills and new prescriptions.  Preparation of Applicable referral forms.

## 2018-07-02 NOTE — PLAN OF CARE
Problem: Patient Care Overview  Goal: Individualization & Mutuality  Patient will remain independent with ADLs daily.   Patient will sleep 6-8 hours a night.  Patient will participate in 50% or more of unit programming.    Outcome: Improving  Patient has been up on the unit this shift.  Patient is attending groups and is calm and cooperative.  Patient did complain of feeling a little restless; requested and given Seroquel 25 mg po.  Patient states he is excited to be discharged tomorrow and is hopeful he will continue to feel better.  Patient states he is sleeping better with regular Seroquel at HS vs the XR.  Patient denies pain or unwanted side effects.  Patient is able to make his needs known to staff.    1911:  No further complaints of anxiety.  Behavior remains calm and cooperative.    Problem: Thought Process Alteration (Adult)  Goal: Improved Thought Process  Patient will verbalize a reduction/resolution in hallucinations and delusional thinking by 7/3/18     Outcome: Improving  Patient denies hallucinations and able to carry on a linear and reality based conversation.

## 2018-07-02 NOTE — PLAN OF CARE
"Problem: Patient Care Overview  Goal: Individualization & Mutuality  Patient will remain independent with ADLs daily.   Patient will sleep 6-8 hours a night.  Patient will participate in 50% or more of unit programming.    0002: Pt requested and received PRN atarax 50 mg PO and Seroquel 25 mg PO for anxiety/restlessness. Pt reports difficulty falling asleep. He states, \"I am laying down and I can't shut my mind off and sleep.\"  Pt reminded to also use relaxation techniques. At 0245, pt observed to be asleep. Eyes closed and unlabored respirations noted. Pt in supine position. 15 minute safety and PRN checks done throughout shift. Will continue to monitor and document any changes.    0630: Pt observed in bed and appears to be asleep. Pt slept about 7 hours and continues to be asleep at this time. Writer did notice on a few occasions patient restless in bed sleeping. No other issues verbalized by patient or observed by writer.       "

## 2018-07-03 VITALS
RESPIRATION RATE: 16 BRPM | BODY MASS INDEX: 24.23 KG/M2 | TEMPERATURE: 99.1 F | OXYGEN SATURATION: 96 % | SYSTOLIC BLOOD PRESSURE: 134 MMHG | HEART RATE: 90 BPM | WEIGHT: 163.6 LBS | HEIGHT: 69 IN | DIASTOLIC BLOOD PRESSURE: 83 MMHG

## 2018-07-03 PROCEDURE — 25000132 ZZH RX MED GY IP 250 OP 250 PS 637: Performed by: NURSE PRACTITIONER

## 2018-07-03 RX ADMIN — NICOTINE 1 PATCH: 14 PATCH, EXTENDED RELEASE TRANSDERMAL at 08:30

## 2018-07-03 RX ADMIN — LITHIUM CARBONATE 300 MG: 300 TABLET, EXTENDED RELEASE ORAL at 08:32

## 2018-07-03 NOTE — PLAN OF CARE
Problem: Patient Care Overview  Goal: Discharge Needs Assessment  Outcome: Adequate for Discharge Date Met: 07/03/18  Pt is discharging at the recommendation of the treatment team. Pt is discharging to home transported by taxi. Pt has follow up with [facility], [facility], [facility], and [place]. Discharge instructions, including; demographic sheet, psychiatric evaluation, discharge summary, and AVS were faxed to these next level of care providers.

## 2018-07-03 NOTE — PLAN OF CARE
Discharge Note    Patient Discharged to home on 7/3/2018 10:05 AM via Taxi accompanied by .     Patient informed of discharge instructions in AVS. patient verbalizes understanding and denies having any questions pertaining to AVS. Patient stable at time of discharge. Patient denies SI, HI, and thoughts of self harm at time of discharge. All personal belongings returned to patient. Discharge prescriptions sent to [facility] via electronic communication. Psych evaluation, history and physical, AVS, and discharge summary faxed to next level of care- by unit JEAN-PIERRE..     Robina Merlos  7/3/2018  10:05 AM

## 2018-07-03 NOTE — DISCHARGE INSTRUCTIONS
"Behavioral Discharge Planning and Instructions    Summary: Patient was admitted with increased psychosis     Main Diagnosis: Psychosis, NOS, Insomnia, NOS, Cannabis abuse, with intoxication, r/o with induced perceptual disorder    Major Treatments, Procedures and Findings: Stabilize with medications, connect with community programs.    Symptoms to Report: feeling more aggressive, increased confusion, losing more sleep, mood getting worse or thoughts of suicide    Lifestyle Adjustment: Take all medications as prescribed, meet with doctor/ medication provider, out patient therapist, , and [facility] worker as scheduled. Abstain from alcohol or any unprescribed drugs.    Psychiatry Follow-up:       [facility]  - Sabine Baca- As arranged   [address] [facility] [address]  [place] [address]  Phone: [phone]    [facility]  PCP- Dr. Garcia- July 6th @ 11:15am   Will need Lithium Level drawn  [address] [place] [address]    Phone\"[phone]   Fax:[phone]     [facility] Psychiatry   Medication Management- Dr. Jesus - July 25th @ 8:00am    [address] [place] [address]  Phone: [phone]  Fax: [phone]    [facility]- Diagnostic assessment - July 19th @ 9:15am   [address] [place], [place] [address] [phone]  Phone: [phone]  Fax: [phone]      Resources:   Crisis Intervention: 110.738.7342 or 138-145-2574 (TTY: 369.652.4914).  Call anytime for help.  [place] on [place]ness (www.[place].farnaz.org): 817.178.4380 or 973-231-2355.  Alcoholics Anonymous (www.alcoholics-anonymous.org): Check your phone book for your local chapter.  Suicide Awareness Voices of Education (SAVE) (www.save.org): 649-807-ZBVX (1052)  National Suicide Prevention Line (www.mentalhealthmn.org): 454-047-WEOA (0476)  Mental Health Consumer/Survivor Network of [place] (www.mhcsn.net): 878.980.2478 or 067-423-6192  Mental Health Association of [place] " "(www.mentalhealth.org): 983.240.3095 or 146-971-2338    General Medication Instructions:   See your medication sheet(s) for instructions.   Take all medicines as directed.  Make no changes unless your doctor suggests them.   Go to all your doctor visits.  Be sure to have all your required lab tests. This way, your medicines can be refilled on time.  Do not use any drugs not prescribed by your doctor.  Avoid alcohol.      [place] Statewide:  [place] Crisis and Referral Services: 1-408.515.8846  National Suicide Prevention Lifeline: 7-348-559-TALK (4602)   - sxa5oedo- Text \"Life\" to 69460  First Call for Help: 2-1-1  JENNIFER Helpline- 8-603-LQXX-HELP      "

## 2018-07-03 NOTE — PLAN OF CARE
Problem: Patient Care Overview  Goal: Individualization & Mutuality  Patient will remain independent with ADLs daily.   Patient will sleep 6-8 hours a night.  Patient will participate in 50% or more of unit programming.    Outcome: Therapy, unable to show any progress toward functional goals  Pt denies SI HI hallucinations, derepression anxiety and pain he is excited to discharge.  He is compliant with AM medications. Pt showered this AM , ate breakfast and attended group.     Problem: Thought Process Alteration (Adult)  Goal: Improved Thought Process  Patient will verbalize a reduction/resolution in hallucinations and delusional thinking by 7/3/18      Outcome: Adequate for Discharge Date Met: 07/03/18  Pt denies Hallucinations at this time

## 2018-07-03 NOTE — PLAN OF CARE
Problem: Patient Care Overview  Goal: Individualization & Mutuality  Patient will remain independent with ADLs daily.   Patient will sleep 6-8 hours a night.  Patient will participate in 50% or more of unit programming.    Pt has been in bed with eyes closed and regular respirations. 15 minute and PRN checks all night. No complaints offered. Will continue to monitor.      Monica Patel  7/3/2018  12:20 AM

## 2018-07-03 NOTE — PLAN OF CARE
Face to face end of shift report received from Monica GUARDADO Rn. Rounding completed. Patient observed in [facility].     Robina Merlos  7/3/2018  7:57 AM
